# Patient Record
Sex: FEMALE | Race: WHITE
[De-identification: names, ages, dates, MRNs, and addresses within clinical notes are randomized per-mention and may not be internally consistent; named-entity substitution may affect disease eponyms.]

---

## 2019-06-21 ENCOUNTER — HOSPITAL ENCOUNTER (OUTPATIENT)
Dept: HOSPITAL 88 - LAB | Age: 84
End: 2019-06-21
Attending: PODIATRIST
Payer: MEDICARE

## 2019-06-21 DIAGNOSIS — B35.1: Primary | ICD-10-CM

## 2019-06-21 LAB
ALBUMIN SERPL-MCNC: 3.8 G/DL (ref 3.5–5)
ALP SERPL-CCNC: 80 IU/L (ref 40–150)
ALT SERPL-CCNC: 6 IU/L (ref 0–55)
BILIRUB CONJ SERPL-MCNC: 0.3 MG/DL (ref 0–0.5)

## 2019-06-21 PROCEDURE — 36415 COLL VENOUS BLD VENIPUNCTURE: CPT

## 2019-06-21 PROCEDURE — 80076 HEPATIC FUNCTION PANEL: CPT

## 2019-07-27 ENCOUNTER — HOSPITAL ENCOUNTER (INPATIENT)
Dept: HOSPITAL 88 - MED/SURG2 | Age: 84
LOS: 8 days | Discharge: HOME | DRG: 193 | End: 2019-08-04
Attending: INTERNAL MEDICINE | Admitting: INTERNAL MEDICINE
Payer: COMMERCIAL

## 2019-07-27 VITALS — SYSTOLIC BLOOD PRESSURE: 112 MMHG | DIASTOLIC BLOOD PRESSURE: 57 MMHG

## 2019-07-27 VITALS — DIASTOLIC BLOOD PRESSURE: 57 MMHG | SYSTOLIC BLOOD PRESSURE: 112 MMHG

## 2019-07-27 VITALS — DIASTOLIC BLOOD PRESSURE: 46 MMHG | SYSTOLIC BLOOD PRESSURE: 94 MMHG

## 2019-07-27 VITALS — WEIGHT: 142.19 LBS | HEIGHT: 64 IN | BODY MASS INDEX: 24.28 KG/M2

## 2019-07-27 DIAGNOSIS — J80: ICD-10-CM

## 2019-07-27 DIAGNOSIS — I10: ICD-10-CM

## 2019-07-27 DIAGNOSIS — R06.03: ICD-10-CM

## 2019-07-27 DIAGNOSIS — K66.8: ICD-10-CM

## 2019-07-27 DIAGNOSIS — Z87.891: ICD-10-CM

## 2019-07-27 DIAGNOSIS — G89.29: ICD-10-CM

## 2019-07-27 DIAGNOSIS — J18.1: Primary | ICD-10-CM

## 2019-07-27 DIAGNOSIS — R09.02: ICD-10-CM

## 2019-07-27 DIAGNOSIS — R33.9: ICD-10-CM

## 2019-07-27 DIAGNOSIS — I51.7: ICD-10-CM

## 2019-07-27 DIAGNOSIS — E78.5: ICD-10-CM

## 2019-07-27 DIAGNOSIS — I44.7: ICD-10-CM

## 2019-07-27 PROCEDURE — 93005 ELECTROCARDIOGRAM TRACING: CPT

## 2019-07-27 PROCEDURE — 71045 X-RAY EXAM CHEST 1 VIEW: CPT

## 2019-07-27 PROCEDURE — 80048 BASIC METABOLIC PNL TOTAL CA: CPT

## 2019-07-27 PROCEDURE — 87070 CULTURE OTHR SPECIMN AEROBIC: CPT

## 2019-07-27 PROCEDURE — 83735 ASSAY OF MAGNESIUM: CPT

## 2019-07-27 PROCEDURE — 93306 TTE W/DOPPLER COMPLETE: CPT

## 2019-07-27 PROCEDURE — 94664 DEMO&/EVAL PT USE INHALER: CPT

## 2019-07-27 PROCEDURE — 36415 COLL VENOUS BLD VENIPUNCTURE: CPT

## 2019-07-27 PROCEDURE — 84484 ASSAY OF TROPONIN QUANT: CPT

## 2019-07-27 PROCEDURE — 80202 ASSAY OF VANCOMYCIN: CPT

## 2019-07-27 PROCEDURE — 87205 SMEAR GRAM STAIN: CPT

## 2019-07-27 PROCEDURE — 85025 COMPLETE CBC W/AUTO DIFF WBC: CPT

## 2019-07-27 PROCEDURE — 87040 BLOOD CULTURE FOR BACTERIA: CPT

## 2019-07-27 PROCEDURE — 71260 CT THORAX DX C+: CPT

## 2019-07-27 NOTE — NUR
Received patient from ems via stretcher. AAOX3 and amb. No resp distress at this time. 
Patient stated she take Ventolin inhaler prn. Denies hx of copd or other respiratory issues. 
Applied o2 2L n/c for saturation of 88% per protocol. Denies pain. No n/v/d. Call light 
within reach and instructed to call for assistance.  at bedside. Refuses bed alarm.

## 2019-07-28 VITALS — DIASTOLIC BLOOD PRESSURE: 60 MMHG | SYSTOLIC BLOOD PRESSURE: 135 MMHG

## 2019-07-28 VITALS — DIASTOLIC BLOOD PRESSURE: 56 MMHG | SYSTOLIC BLOOD PRESSURE: 115 MMHG

## 2019-07-28 VITALS — SYSTOLIC BLOOD PRESSURE: 117 MMHG | DIASTOLIC BLOOD PRESSURE: 59 MMHG

## 2019-07-28 VITALS — DIASTOLIC BLOOD PRESSURE: 56 MMHG | SYSTOLIC BLOOD PRESSURE: 111 MMHG

## 2019-07-28 VITALS — SYSTOLIC BLOOD PRESSURE: 121 MMHG | DIASTOLIC BLOOD PRESSURE: 59 MMHG

## 2019-07-28 LAB
ANION GAP SERPL CALC-SCNC: 11.5 MMOL/L (ref 8–16)
BASOPHILS # BLD AUTO: 0 10*3/UL (ref 0–0.1)
BASOPHILS NFR BLD AUTO: 0.3 % (ref 0–1)
BUN SERPL-MCNC: 13 MG/DL (ref 7–26)
BUN/CREAT SERPL: 18 (ref 6–25)
CALCIUM SERPL-MCNC: 8.9 MG/DL (ref 8.4–10.2)
CHLORIDE SERPL-SCNC: 106 MMOL/L (ref 98–107)
CO2 SERPL-SCNC: 26 MMOL/L (ref 22–29)
DEPRECATED NEUTROPHILS # BLD AUTO: 5.9 10*3/UL (ref 2.1–6.9)
EGFRCR SERPLBLD CKD-EPI 2021: > 60 ML/MIN (ref 60–?)
EOSINOPHIL # BLD AUTO: 0 10*3/UL (ref 0–0.4)
EOSINOPHIL NFR BLD AUTO: 0.4 % (ref 0–6)
ERYTHROCYTE [DISTWIDTH] IN CORD BLOOD: 13.3 % (ref 11.7–14.4)
GLUCOSE SERPLBLD-MCNC: 123 MG/DL (ref 74–118)
HCT VFR BLD AUTO: 34.7 % (ref 34.2–44.1)
HGB BLD-MCNC: 10.8 G/DL (ref 12–16)
LYMPHOCYTES # BLD: 1 10*3/UL (ref 1–3.2)
LYMPHOCYTES NFR BLD AUTO: 13 % (ref 18–39.1)
MCH RBC QN AUTO: 27.3 PG (ref 28–32)
MCHC RBC AUTO-ENTMCNC: 31.1 G/DL (ref 31–35)
MCV RBC AUTO: 87.8 FL (ref 81–99)
MONOCYTES # BLD AUTO: 0.7 10*3/UL (ref 0.2–0.8)
MONOCYTES NFR BLD AUTO: 9.3 % (ref 4.4–11.3)
NEUTS SEG NFR BLD AUTO: 76.6 % (ref 38.7–80)
PLATELET # BLD AUTO: 204 X10E3/UL (ref 140–360)
POTASSIUM SERPL-SCNC: 3.5 MMOL/L (ref 3.5–5.1)
RBC # BLD AUTO: 3.95 X10E6/UL (ref 3.6–5.1)
SODIUM SERPL-SCNC: 140 MMOL/L (ref 136–145)

## 2019-07-28 RX ADMIN — CEFTRIAXONE SCH MLS/HR: 100 INJECTION, POWDER, FOR SOLUTION INTRAVENOUS at 15:16

## 2019-07-28 RX ADMIN — SODIUM CHLORIDE SCH MLS/HR: 900 INJECTION, SOLUTION INTRAVENOUS at 16:15

## 2019-07-28 RX ADMIN — METOPROLOL TARTRATE SCH MG: 25 TABLET, FILM COATED ORAL at 17:00

## 2019-07-28 RX ADMIN — ATORVASTATIN CALCIUM SCH MG: 20 TABLET, FILM COATED ORAL at 21:18

## 2019-07-28 RX ADMIN — AMLODIPINE BESYLATE SCH MG: 5 TABLET ORAL at 08:36

## 2019-07-28 RX ADMIN — METOPROLOL TARTRATE SCH MG: 25 TABLET, FILM COATED ORAL at 08:36

## 2019-07-28 NOTE — NUR
LFA 20 G IV REMOVED DUE TO LEAKING. TIP INTACT. NO BLEEDING NOTED. DRESSING APPLIED. PT 
DENIED FURTHER NEEDS.

## 2019-07-28 NOTE — DIAGNOSTIC IMAGING REPORT
EXAMINATION: CT of the chest with contrast, PE protocol.



TECHNIQUE:  Spiral CT images of the chest were performed from the lung apices

through the level of the adrenal glands after the IV administration of 100 cc

of Isovue 370.  Thin section reconstructions were obtained with special

concentration on the pulmonary arteries. Coronal and sagittal reformatted

images were also performed.



COMPARISON: <none>



CLINICAL HISTORY:Hypoxia, rule out PE

     

DISCUSSION:



Lungs:  No filling defects are identified in the main, right or left pulmonary

arteries to their segmental levels, to suggest pulmonary embolism.

Airspace consolidation with air bronchograms involving the right middle lobe

(series 3, image 77 and sagittal image 40).

1.6 x 1.0 cm nodular density with linear extensions to the adjacent subpleural

space in the right upper lobe and with associated focal bronchiectasis (series

3, image 40).

Mild bilateral upper lobe emphysematous changes.

Atelectatic changes in the posterior right lower lobe and inferior left lower

lobe (series 3, images 90 and 96).

Moderate central bronchial wall thickening in the upper and lower lobes.

Airways are clear, without endobronchial lesion.



Pleura:  Small right-sided pleural effusion. No pneumothorax.



Heart and mediastinum:  Thyroid is unremarkable. Heart size is normal. No

pericardial effusion. Atherosclerotic calcification of the coronary arteries

and thoracic aorta. Main pulmonary artery measures approximately 3.0 cm.



Lymph nodes: Calcified right lower paratracheal and right hilar lymph nodes. No

adenopathy.



Abdomen:  Multiple splenic and hepatic calcified granulomas. Subcentimeter

hypodense lesions in the right and left hepatic lobe (series 2, images 103 and

107, respectively), which are too small to characterize.

2 mm and punctate nonobstructing calculi in the right renal superior pole

(series 2, images 110, 113 and 119).

1.3 x 1.3 cm hypodense lesion in the left adrenal gland (series 2, image 111),

which measures less than 10 Hounsfield units on this contrast-enhanced exam.

Additional 1.0 x 1.0 cm hypodense lesion in the left adrenal gland, which

measures less than 10 Hounsfield units (series 2, image 117).

Atherosclerotic calcification of the abdominal aorta and aortic branches 



Bones and soft tissues:  No aggressive lytic lesions. Generalized osteopenia.

Soft tissues are grossly unremarkable.



IMPRESSION: 



1. No CT evidence of pulmonary embolism.



2. Findings consistent with right middle lobe pneumonia.

3. 1.6 cm nodular density with associated linear opacities to the adjacent

substernal space and focal bronchiectasis likely represents chronic

postinfectious scarring. An additional focus of pneumonia is a less likely

consideration.

4. Small right-sided pleural effusion and bibasilar atelectasis.

5. Moderate central bronchial wall thickening in the upper and lower lobes,

likely representing sequela of chronic bronchitis.

6. 1.3 and 1.0 cm well circumscribed hypodense lesions in the left adrenal

gland are consistent with benign, lipid rich adenomas.

7. Nonobstructing calculi in the right superior pole, with the largest

measuring 2 mm.

8. Calcified mediastinal and right hilar nodes and splenic and hepatic

granulomas, consistent with prior granulomatous disease.









Signed by: Dr. Deven Rocha M.D. on 7/28/2019 5:33 PM

## 2019-07-28 NOTE — HISTORY AND PHYSICAL
CHIEF COMPLAINT:  Cough and congestion.

 

HISTORY OF PRESENT ILLNESS:  This is an 84-year-old  female with past medical

history of hypertension, hyperlipidemia, urinary retention, who presents from an outside

ER with complaints of cough, congestion, and subjective fevers ongoing for the last

several days.  The patient reports she had similar symptoms about a week ago, then

resolved suddenly over the last two or three days.  She has noticed increased cough,

congestion, subjective fever, went to an outside ER for further evaluation and

management.  She denies any chest pain, [id number], shortness of breath, or any other

complaints.  Endorses to have a nonproductive cough with congestion and occasional

fever.  Denies any diarrhea.  __________ she was found to be hypoxic and O2 sats in the

80s on arrival.  She denies any history of smoking or any home oxygen as well.  The

patient was seen and evaluated at bedside on the medical floor.  She was put on nasal

oxygen at 5 L.  She states she is feeling much better on examination.  Vital signs

stable when I evaluated her. 

 

REVIEW OF SYSTEMS:

Pertinent positive:  Cough and congestion.  Subjective fevers. 

 

Pertinent negative:  Denies any chest pain, palpitations, nausea, vomiting, diarrhea,

dysuria, hematuria, frequency, urgency, lightheadedness, dizziness, abdominal pain,

headaches, or any other complaints. 

 

The rest of the 14-point review of systems have been reviewed with the patient and are

negative. 

 

ALLERGIES:  SULFA.

 

HOME MEDICATIONS:  

1. Albuterol inhaler.

2. Norvasc 5 mg daily.

3. __________. 

4. Metoprolol tartrate 25 mg p.o. b.i.d.

5. Oxybutynin 5 mg __________ as needed.

6. Sertraline 25 mg daily.

 

PAST MEDICAL HISTORY:  Hypertension, urinary retention, and hyperlipidemia.

 

PAST SURGICAL HISTORY:  None.

 

FAMILY HISTORY:  Hypertension and diabetes.

 

SOCIAL HISTORY:  No drugs.  No alcohol.  Does not smoke.  Good social support.  She is

. 

 

PHYSICAL EXAMINATION:

VITAL SIGNS:  Temperature is 97.5, pulse 71, respiratory rate is 16, blood pressure is

115/56, and pulse ox 94%, now on 2 L nasal cannula. 

GENERAL:  Not in acute distress.  Alert and oriented x3.  Cooperative on examination. 

HEENT:  Head is normocephalic and atraumatic.  Eyes; pupils are equal, round, and

reactive to light bilaterally.  Extraocular muscles are intact bilaterally.  Throat, no

evidence of erythema or exudates in the posterior pharynx.  Has poor dentition. 

NECK:  Supple.  Good range of motion. 

PULMONARY:  Clear to auscultation bilaterally.  No wheezing, no rales, no rhonchi, no

crackles appreciated. 

CARDIOVASCULAR:  Positive S1, S2.  No murmurs, rubs, or gallops appreciated. 

ABDOMEN:  Soft, nondistended, and nontender to palpation.  Bowel sounds present. 

MUSCULOSKELETAL:  Strength is 5/5 throughout.  No evidence of any muscle deficits on

examination.  No weakness appreciated. 

NEUROLOGICAL:  Cranial nerves 2 through 12 grossly intact.  No evidence of any

neurological deficits on exam. 

SKIN:  Intact.  Warm to touch.  Good cap refill. 

PSYCHIATRIC:  Normal affect and mood. 

EXTREMITIES:  No edema.  Good range of motion throughout.

LAB FINDINGS:  Show white count 7.7, hemoglobin 10.8, hematocrit 34.7, platelets of 204.

 Chemistry, sodium 140, potassium 3.5, chloride 106, bicarb 26, anion gap of 11, BUN 13,

creatinine 0.72, glucose 123, calcium is 8.9.  Urinalysis was negative. 

 

MICROBIOLOGY:  None.

 

IMAGING STUDIES:  At outside freestanding ER, chest x-ray shows right middle lobe

opacity, consistent with pneumonia, edema, or under volume loss.  There are some ground

gross opacities seen. 

 

IMPRESSION:  

1. Acute hypoxemic respiratory distress, likely due to multifocal pneumonia.

2. Community-acquired pneumonia.

3. Hypertension.

4. Hyperlipidemia.

5. Left bundle branch block.

 

PLAN:  At this time, we are going to follow up on blood cultures at the outside

facility, which we are going to confirm and see on that.  She is currently afebrile,

normotensive, respiratory rate is good, and her white count is normal.  Continue with IV

azithromycin and Rocephin, I have no other EKG to compare.  At this time, I may get

Cardiology to come see her because there is left bundle-branch block which appeared to

be old, but I do not have records to prove that.  I am going to resume same home

medications.  Trend troponins.  Continue with cough syrup and Tessalon Perles for cough.

 Ambulate as tolerated as well as PT and OT evaluation.  Lovenox for DVT prophylaxis.

Our goal is to wean off oxygen in the event we do not.  I want to go ahead and get a CT

chest with PE protocol to see if she improves otherwise. 

 

 

 

 

______________________________

MD NEGRA Saldaña/MODL

D:  07/28/2019 15:01:03

T:  07/28/2019 17:41:22

Job #:  [id number]

## 2019-07-29 VITALS — SYSTOLIC BLOOD PRESSURE: 115 MMHG | DIASTOLIC BLOOD PRESSURE: 59 MMHG

## 2019-07-29 VITALS — SYSTOLIC BLOOD PRESSURE: 135 MMHG | DIASTOLIC BLOOD PRESSURE: 74 MMHG

## 2019-07-29 VITALS — DIASTOLIC BLOOD PRESSURE: 64 MMHG | SYSTOLIC BLOOD PRESSURE: 126 MMHG

## 2019-07-29 VITALS — SYSTOLIC BLOOD PRESSURE: 148 MMHG | DIASTOLIC BLOOD PRESSURE: 74 MMHG

## 2019-07-29 VITALS — SYSTOLIC BLOOD PRESSURE: 137 MMHG | DIASTOLIC BLOOD PRESSURE: 65 MMHG

## 2019-07-29 VITALS — SYSTOLIC BLOOD PRESSURE: 121 MMHG | DIASTOLIC BLOOD PRESSURE: 58 MMHG

## 2019-07-29 VITALS — DIASTOLIC BLOOD PRESSURE: 54 MMHG | SYSTOLIC BLOOD PRESSURE: 109 MMHG

## 2019-07-29 VITALS — DIASTOLIC BLOOD PRESSURE: 59 MMHG | SYSTOLIC BLOOD PRESSURE: 128 MMHG

## 2019-07-29 LAB
ANION GAP SERPL CALC-SCNC: 14.9 MMOL/L (ref 8–16)
BASOPHILS # BLD AUTO: 0 10*3/UL (ref 0–0.1)
BASOPHILS NFR BLD AUTO: 0.6 % (ref 0–1)
BUN SERPL-MCNC: 7 MG/DL (ref 7–26)
BUN/CREAT SERPL: 11 (ref 6–25)
CALCIUM SERPL-MCNC: 9.2 MG/DL (ref 8.4–10.2)
CHLORIDE SERPL-SCNC: 105 MMOL/L (ref 98–107)
CO2 SERPL-SCNC: 25 MMOL/L (ref 22–29)
DEPRECATED NEUTROPHILS # BLD AUTO: 3.3 10*3/UL (ref 2.1–6.9)
EGFRCR SERPLBLD CKD-EPI 2021: > 60 ML/MIN (ref 60–?)
EOSINOPHIL # BLD AUTO: 0.2 10*3/UL (ref 0–0.4)
EOSINOPHIL NFR BLD AUTO: 3 % (ref 0–6)
ERYTHROCYTE [DISTWIDTH] IN CORD BLOOD: 13.5 % (ref 11.7–14.4)
GLUCOSE SERPLBLD-MCNC: 82 MG/DL (ref 74–118)
HCT VFR BLD AUTO: 36.8 % (ref 34.2–44.1)
HGB BLD-MCNC: 11.6 G/DL (ref 12–16)
LYMPHOCYTES # BLD: 1.3 10*3/UL (ref 1–3.2)
LYMPHOCYTES NFR BLD AUTO: 24.7 % (ref 18–39.1)
MCH RBC QN AUTO: 27.6 PG (ref 28–32)
MCHC RBC AUTO-ENTMCNC: 31.5 G/DL (ref 31–35)
MCV RBC AUTO: 87.6 FL (ref 81–99)
MONOCYTES # BLD AUTO: 0.4 10*3/UL (ref 0.2–0.8)
MONOCYTES NFR BLD AUTO: 8.4 % (ref 4.4–11.3)
NEUTS SEG NFR BLD AUTO: 63.1 % (ref 38.7–80)
PLATELET # BLD AUTO: 220 X10E3/UL (ref 140–360)
POTASSIUM SERPL-SCNC: 3.9 MMOL/L (ref 3.5–5.1)
RBC # BLD AUTO: 4.2 X10E6/UL (ref 3.6–5.1)
SODIUM SERPL-SCNC: 141 MMOL/L (ref 136–145)

## 2019-07-29 RX ADMIN — SODIUM CHLORIDE SCH MLS/HR: 900 INJECTION, SOLUTION INTRAVENOUS at 16:30

## 2019-07-29 RX ADMIN — AMLODIPINE BESYLATE SCH MG: 5 TABLET ORAL at 08:06

## 2019-07-29 RX ADMIN — METOPROLOL TARTRATE SCH MG: 25 TABLET, FILM COATED ORAL at 16:37

## 2019-07-29 RX ADMIN — METOPROLOL TARTRATE SCH MG: 25 TABLET, FILM COATED ORAL at 08:06

## 2019-07-29 RX ADMIN — ATORVASTATIN CALCIUM SCH MG: 20 TABLET, FILM COATED ORAL at 20:42

## 2019-07-29 RX ADMIN — CEFTRIAXONE SCH MLS/HR: 100 INJECTION, POWDER, FOR SOLUTION INTRAVENOUS at 15:35

## 2019-07-29 NOTE — NUR
Nutrition Intervention Note



RD Recommendation(s) for Physician: 

-Continue Regular diet

-Recommend Ensure Compact TID



Plan of Care: RD following, monitoring for tolerance and adequacy 



Nutrition reason for involvement:

Nutrition Risk Trigger



RD Assessment

7/29: 84 YOF admitted for PNA, seen today per MST screen. Pt reports decreased appetite x 1 
month and that she was forcing herself to eat, but could not quantify if she was eating less 
than her typical intake. Per chart pt eating 50% of meals. Pt can not recall UBW, appears 
well nourished. Pt reports tolerating Regular diet, receptive to Ensure Compact. Pt denies 
any GI distress or difficulties chewing or swallowing. Pt discussed during am rounds. Will 
monitor and continue to follow. 



Principal Problems/Diagnoses: Pneumonia 



PMH: HTN, HLD



GI: LBM 7/29- diarrhea, on abx 



Skin: intact 



Labs: reviewed; BMP WNL 



Meds: lipitor, abx, zofran 



Ht: 64 in

Wt: 150 lb

BMI: 25.7

IBW: 120 lb 



Malnutrition Evaluation (7/29l/19)

The patient does not meet criteria for a specified degree of malnutrition at this time. Will 
re-evaluate at follow-up as appropriate. 





Nutrition Prescription (Diet Order): Regular 



Estimated Nutritional Needs:

2145-6072 calories/day (22-30 kcal/kg CBW)

 g protein/day (1-1.5 g pro/kg CBW)





Diet Adequacy:

Not meeting calorie needs, Not meeting protein needs



Diet Education Needs Assessment:

Diet education not indicated; patient on regular diet. 



Nutrition Care Level: Mod 



Nutrition Diagnosis: Inadequate energy and protein intake related to PNA as evidenced by 
decreased appetite, decreased po intake, and not meeting needs x 1 mo PTA. 





Goal: Patient will meet % of estimated needs by follow up 

Progress: N/A



Interventions:

General healthful diet, Schedule of food, Commercial beverage, Recommended Modifications, 
Collaboration with other providers



Monitoring/Evaluation:

Total energy intake, Total protein intake, Liquid supplement





Signed: Hamida Portillo RD, LD, [facility]

## 2019-07-29 NOTE — CONSULTATION
DATE OF CONSULTATION:  

 

Cardiology Consultation 

 

CHIEF COMPLAINT:  The patient is an 84-year-old with fever and chills.

 

HISTORY OF PRESENT ILLNESS:  The patient is an 84-year-old, who developed fever and

chills at home.  She reported dyspnea and cough with some yellow sputum production.  The

patient was noted to have an infiltrate on the x-ray and was subsequently admitted.  The

patient was noted to have a left bundle branch block on EKG and a Cardiology

consultation was called. 

 

PAST MEDICAL AND SURGICAL HISTORY:  Significant for:

1. Hypercholesterolemia.

2. Hypertension.

3. Previous hip surgery.

4. Previous kidney surgery.

 

MEDICATIONS:  At home, include:

1. Simvastatin.

2. Metoprolol.

3. Amlodipine.

 

SOCIAL HISTORY:  The patient does not drink and does not smoke.

 

FAMILY HISTORY:  There is no known family history of coronary artery disease.

 

PHYSICAL EXAMINATION:

GENERAL:  The patient is a well-developed, well-nourished female, in no obvious

distress. 

VITAL SIGNS:  Include a temperature of 98.6, blood pressure 130/70, and pulse 84. 

HEAD, EARS, EYES, NOSE, AND THROAT:  The patient's cranium was normocephalic and

atraumatic.  Extraocular muscles were intact.  Sclerae were anicteric.  Pupils were

equal, round, and reactive to light.  There was no pallor or cyanosis of the oral

mucosa.  There was no erythema or edema of the throat. 

NECK:  Supple.  No jugular venous distention.  No carotid bruits. 

CHEST:  Demonstrated rales and rhonchi. 

CARDIAC:  Demonstrated normal S1 and S2 with a short 2/6 systolic murmur. 

ABDOMEN:  Demonstrated good bowel sounds.  No tenderness and no masses. 

EXTREMITIES:  There was no clubbing, no cyanosis, and no edema. 

NEUROLOGIC:  The patient was alert and oriented x3.  Cranial nerves II through XII were

intact.  Motor strength was +5/+5 in all limbs. 

RADIOGRAPHIC DATA:  The patient's EKG demonstrated normal sinus rhythm with a left

bundle branch block. 

 

IMPRESSION AND PLAN:  The patient is an 84-year-old admitted with pneumonia.  The

patient has a left bundle branch block on her EKG, which seems to be chronic.  The

patient has had no chest pain and no symptoms suggestive of cardiac ischemia.  I do feel

that the patient 

should have an echocardiogram to assess the left ventricular size and function given the

left bundle branch block. 

 

 

 

 

______________________________

MD VLADIMIR Cuevas/SOCAR

D:  07/29/2019 15:30:55

T:  07/29/2019 22:44:44

Job #:  [id number]

 

cc:            Isaac Cueto MD

## 2019-07-29 NOTE — DIAGNOSTIC IMAGING REPORT
KNEE LEFT 2 VIEWS, KNEE RIGHT 2 VIEWS 



HISTORY:  Pain.    

COMPARISON: None available.

     

FINDINGS:

Bones:

No acute displaced fracture.  

Osseous alignment is within normal limits.



Joints:

The joint spaces are well-maintained.

Mild sclerotic changes in the knee joints.

No effusions.



Soft tissues:

Vascular calcifications.



IMPRESSION: 



No acute radiographic abnormality.

Degenerative changes in the knees.



Signed by: Drew Obregon DO on 7/29/2019 10:23 PM

## 2019-07-29 NOTE — NUR
NURSE WAS WALKING WITH PATIENT AT THE [address]. PATIENT WAS WALKING A LITTLE FAST. NURSE 
REMINDED PATIENT TO WALK SLOWLY. PATIENT KEPT WALKING FAST. PATIENT TRIP AND FELL ON HER 
KNEE. PATIENT SAID SHE WASN'T COMFORTABLE IN HER NEW SHOE. RIGHT KNEE REDNESS NOTED. PATIENT 
DENIED OF ANY PAIN OR DISCOMFORT. NOTIFIED CHARGE NURSE. CALLED DR HASTINGS. NEW ORDER RECEIVED 
FOR GALE KNEE XRAY

## 2019-07-29 NOTE — PROGRESS NOTE
DATE:  07/29/2019

 

Medicine Progress Note 

 

SUBJECTIVE:  The patient is doing much better today.  Her oxygen was removed yesterday

last night.  She is breathing well.  She did not ambulate across the [place]int[place], which I will

like to see how she does with ambulation.  Of note, she was on 2 L nasal cannula

according to chart early this morning.  She is not coughing.  She is doing better.  No

other issues.  Blood cultures at the outside ER, which are still pending. 

 

PHYSICAL EXAMINATION:

VITAL SIGNS:  Temperature 96.8, pulse 80, respiratory rate 17, blood pressure 126/64,

pulse ox 91% on room air. 

GENERAL:  Not in acute distress.  Alert and oriented x3.  Cooperative on examination. 

HEENT:  Head is normocephalic and atraumatic.  Eyes; pupils are equal, round, and

reactive to light bilaterally.  Extraocular movements are intact bilaterally.  Throat,

no evidence of erythema or exudates in the posterior pharynx.  Has poor dentition. 

NECK:  Supple.  Good range of motion. 

PULMONARY:  Clear to auscultation bilaterally.  No wheezing, no rales, no rhonchi, no

crackles appreciated. 

CARDIOVASCULAR:  Positive S1, S2.  No murmurs, rubs, or gallops appreciated. 

ABDOMEN:  Soft, nondistended, and nontender to palpation.  Bowel sounds present. 

MUSCULOSKELETAL:  Strength is 5/5 throughout.  No evidence of any muscle deficits on

examination.  No weakness appreciated. 

NEUROLOGICAL:  Cranial nerves II through XII grossly intact.  No evidence of any

neurological deficits on exam. 

SKIN:  Intact.  Warm to touch.  Good cap refill. 

PSYCHIATRIC:  Normal affect and mood. 

EXTREMITIES:  No edema.  Good range of motion throughout.

LABORATORY FINDINGS:  Show white count 5.2, hemoglobin 11.6, hematocrit is 36.8,

platelets of 220.  Chemistries reviewed and stable.  Blood cultures at the outside

hospital are pending and verified as of today.  Microbiology none, except for the blood

cultures at the outside ER. 

 

IMAGING STUDIES:  CT chest with IV contrast showed no evidence of pulmonary embolism.

Findings suggestive of right middle lobe pneumonia.  There is a 1.6 cm nodule

__________.  There is some bronchial wall thickening in the upper and lower lobes,

likely representing chronic bronchitis.  There is a 1.31 cm well-circumscribed hypodense

lesions in the left adrenal gland consistent with benign lipid rich adenomas. 

 

IMPRESSION:  

1. Acute hypoxemic respiratory distress secondary to multifocal pneumonia, now off

oxygen. 

2. Community-acquired pneumonia.

3. Hypertension.

4. Hyperlipidemia.

5. Left bundle-branch block. 

At this time, we are going to go ahead and follow with blood cultures at outside ER.  We

have confirmed that there were 2 blood cultures, but there is pending final growth.  So

far, there is no growth to date.  Continue with IV antibiotics azithromycin and

Rocephin.  In relation to her left bundle-branch block, I did consult with Cardiology

and I am not sure if that left bundle-branch block is old or new.  They will hopefully

help us out with this and differentiate that.  May need further workup as an outpatient.

 She is on cough syrup and Tessalon Perles.  Continue with PT and OT.  I did discuss

with the nursing staff to go ahead and ambulate the patient and see if she desaturates.

I will go ahead and consult pulmonologist as well to look at the CT scan and to see if

there is any further workup needed because it shows evidence of chronic bronchitis.

Otherwise, consultants, Pulmonary and Cardiology will continue same plan of care and

monitor very closely. 

 

 

 

 

______________________________

MD NEGRA Saldaña/OSCAR

D:  07/29/2019 13:45:51

T:  07/29/2019 15:30:20

Job #:  [id number]

## 2019-07-29 NOTE — NUR
PATIENT RESTING QUIETLY IN BED. RESP EVEN AND UNLABORED. NO ACUTE DISTRESS NOTED. BED ALARM 
ON. CONTINUE TO MONITOR CLOSELY

## 2019-07-29 NOTE — NUR
IMM letter delivered and explained to pt and family at bedside. They verbalized 
understanding. Signed copy placed in chart. Copy to pt.

## 2019-07-30 VITALS — DIASTOLIC BLOOD PRESSURE: 76 MMHG | SYSTOLIC BLOOD PRESSURE: 141 MMHG

## 2019-07-30 VITALS — SYSTOLIC BLOOD PRESSURE: 142 MMHG | DIASTOLIC BLOOD PRESSURE: 59 MMHG

## 2019-07-30 VITALS — DIASTOLIC BLOOD PRESSURE: 64 MMHG | SYSTOLIC BLOOD PRESSURE: 129 MMHG

## 2019-07-30 VITALS — SYSTOLIC BLOOD PRESSURE: 129 MMHG | DIASTOLIC BLOOD PRESSURE: 64 MMHG

## 2019-07-30 VITALS — SYSTOLIC BLOOD PRESSURE: 134 MMHG | DIASTOLIC BLOOD PRESSURE: 61 MMHG

## 2019-07-30 VITALS — DIASTOLIC BLOOD PRESSURE: 72 MMHG | SYSTOLIC BLOOD PRESSURE: 127 MMHG

## 2019-07-30 VITALS — SYSTOLIC BLOOD PRESSURE: 127 MMHG | DIASTOLIC BLOOD PRESSURE: 59 MMHG

## 2019-07-30 LAB
ANION GAP SERPL CALC-SCNC: 16.4 MMOL/L (ref 8–16)
ANION GAP SERPL CALC-SCNC: 19.1 MMOL/L (ref 8–16)
BASOPHILS # BLD AUTO: 0 10*3/UL (ref 0–0.1)
BASOPHILS # BLD AUTO: 0.1 10*3/UL (ref 0–0.1)
BASOPHILS NFR BLD AUTO: 0.7 % (ref 0–1)
BASOPHILS NFR BLD AUTO: 0.7 % (ref 0–1)
BUN SERPL-MCNC: 10 MG/DL (ref 7–26)
BUN SERPL-MCNC: 7 MG/DL (ref 7–26)
BUN/CREAT SERPL: 12 (ref 6–25)
BUN/CREAT SERPL: 15 (ref 6–25)
CALCIUM SERPL-MCNC: 9.1 MG/DL (ref 8.4–10.2)
CALCIUM SERPL-MCNC: 9.8 MG/DL (ref 8.4–10.2)
CHLORIDE SERPL-SCNC: 102 MMOL/L (ref 98–107)
CHLORIDE SERPL-SCNC: 103 MMOL/L (ref 98–107)
CO2 SERPL-SCNC: 23 MMOL/L (ref 22–29)
CO2 SERPL-SCNC: 25 MMOL/L (ref 22–29)
DEPRECATED NEUTROPHILS # BLD AUTO: 4 10*3/UL (ref 2.1–6.9)
DEPRECATED NEUTROPHILS # BLD AUTO: 4.9 10*3/UL (ref 2.1–6.9)
EGFRCR SERPLBLD CKD-EPI 2021: > 60 ML/MIN (ref 60–?)
EGFRCR SERPLBLD CKD-EPI 2021: > 60 ML/MIN (ref 60–?)
EOSINOPHIL # BLD AUTO: 0.1 10*3/UL (ref 0–0.4)
EOSINOPHIL # BLD AUTO: 0.1 10*3/UL (ref 0–0.4)
EOSINOPHIL NFR BLD AUTO: 1.6 % (ref 0–6)
EOSINOPHIL NFR BLD AUTO: 2.3 % (ref 0–6)
ERYTHROCYTE [DISTWIDTH] IN CORD BLOOD: 13.2 % (ref 11.7–14.4)
ERYTHROCYTE [DISTWIDTH] IN CORD BLOOD: 13.2 % (ref 11.7–14.4)
GLUCOSE SERPLBLD-MCNC: 71 MG/DL (ref 74–118)
GLUCOSE SERPLBLD-MCNC: 93 MG/DL (ref 74–118)
HCT VFR BLD AUTO: 38.4 % (ref 34.2–44.1)
HCT VFR BLD AUTO: 40.7 % (ref 34.2–44.1)
HGB BLD-MCNC: 12.2 G/DL (ref 12–16)
HGB BLD-MCNC: 13.1 G/DL (ref 12–16)
LYMPHOCYTES # BLD: 1.3 10*3/UL (ref 1–3.2)
LYMPHOCYTES # BLD: 1.4 10*3/UL (ref 1–3.2)
LYMPHOCYTES NFR BLD AUTO: 18.9 % (ref 18–39.1)
LYMPHOCYTES NFR BLD AUTO: 22.2 % (ref 18–39.1)
MCH RBC QN AUTO: 27.8 PG (ref 28–32)
MCH RBC QN AUTO: 27.9 PG (ref 28–32)
MCHC RBC AUTO-ENTMCNC: 31.8 G/DL (ref 31–35)
MCHC RBC AUTO-ENTMCNC: 32.2 G/DL (ref 31–35)
MCV RBC AUTO: 86.6 FL (ref 81–99)
MCV RBC AUTO: 87.5 FL (ref 81–99)
MONOCYTES # BLD AUTO: 0.5 10*3/UL (ref 0.2–0.8)
MONOCYTES # BLD AUTO: 0.6 10*3/UL (ref 0.2–0.8)
MONOCYTES NFR BLD AUTO: 8.4 % (ref 4.4–11.3)
MONOCYTES NFR BLD AUTO: 9.1 % (ref 4.4–11.3)
NEUTS SEG NFR BLD AUTO: 65.9 % (ref 38.7–80)
NEUTS SEG NFR BLD AUTO: 69 % (ref 38.7–80)
PLATELET # BLD AUTO: 248 X10E3/UL (ref 140–360)
PLATELET # BLD AUTO: 278 X10E3/UL (ref 140–360)
POTASSIUM SERPL-SCNC: 3.4 MMOL/L (ref 3.5–5.1)
POTASSIUM SERPL-SCNC: 4.1 MMOL/L (ref 3.5–5.1)
RBC # BLD AUTO: 4.39 X10E6/UL (ref 3.6–5.1)
RBC # BLD AUTO: 4.7 X10E6/UL (ref 3.6–5.1)
SODIUM SERPL-SCNC: 140 MMOL/L (ref 136–145)
SODIUM SERPL-SCNC: 141 MMOL/L (ref 136–145)

## 2019-07-30 RX ADMIN — CEFEPIME HYDROCHLORIDE SCH MLS/HR: 1 INJECTION, POWDER, FOR SOLUTION INTRAMUSCULAR; INTRAVENOUS at 20:30

## 2019-07-30 RX ADMIN — ENOXAPARIN SODIUM SCH MG: 40 INJECTION SUBCUTANEOUS at 17:00

## 2019-07-30 RX ADMIN — ATORVASTATIN CALCIUM SCH MG: 20 TABLET, FILM COATED ORAL at 20:31

## 2019-07-30 RX ADMIN — METOPROLOL TARTRATE SCH MG: 25 TABLET, FILM COATED ORAL at 08:35

## 2019-07-30 RX ADMIN — METOPROLOL TARTRATE SCH MG: 25 TABLET, FILM COATED ORAL at 17:00

## 2019-07-30 RX ADMIN — VANCOMYCIN HYDROCHLORIDE SCH MLS/HR: 1 INJECTION, SOLUTION INTRAVENOUS at 16:00

## 2019-07-30 RX ADMIN — AMLODIPINE BESYLATE SCH MG: 5 TABLET ORAL at 08:35

## 2019-07-30 NOTE — CONSULTATION
DATE OF CONSULTATION:  07/29/2019

 

Pulmonary Medicine Consult 

 

PRIMARY CARE DOCTOR:  Eric Daniels MD

 

REASON FOR REFERRAL:  Abnormal chest radiography.

 

HISTORY OF PRESENT ILLNESS:  Mrs. Blevins is a pleasant 84-year-old female with 
abnormal

chest radiography.  The patient presented with 1-1/2 weeks of symptoms.  She had

malaise.  She mostly did not have fevers, but she did have fever that was 
reportedly

high around 102 degrees the day she went to outside hospital emergency room 
facility for

evaluation, although I did not see this citation of high fever.  She was on 3 
L/minute

by nasal cannula to have 89% oxygen saturation initially.  Some airways 
congestion, but

not a lot of phlegm output.  Chest x-ray demonstrated a right middle lobe 
consolidation.

 The patient proceeded to have computed tomography of thorax, which showed a 
right upper

lobe spiculated semi-solid nodule with a penumbra of ground-glass with some 
small

central cavitation.  There was a dense right middle lobe consolidation.  There 
is 31 mm

pulmonary artery. 

 

The patient was admitted and treated with antibiotics and she initially already 
has

improvement of her malaise.  There was not much other findings to track 
progress, but I

note on the computer there are no fevers since hospitalization.  White count is 
normal. 

 

PAST MEDICAL HISTORY:  Hypertension, urinary retention, hyperlipidemia.

 

MEDICATIONS:  Medication list reviewed per the chart record.

 

ALLERGIES:  SULFA.

 

FAMILY HISTORY:  Noncontributory to this condition.

 

SOCIAL HISTORY:  The patient denies drinking and denies drugs.  The patient 
smoked from

age 26 to age 59, 1/2 pack per day.  She is a homemaker.  She was born in 
[place], [place].  She left that area at age 20 and came mainly to [place], where she has
lived

ever since.  However, her  was a .  The patient had transient 
stays in

[place], [place], and other states along the pipe routes.  The patient 
was not an

outdoors person and she did not have pets and never was around a lot of birds or
bats.

The patient would go [place] fishing every 2 weeks when they were younger, 
but quit

when the patient's children grew up.  She would sometime camp for a few days, 
but no

longer camp since her children grew up. 

 

REVIEW OF SYSTEMS:

GENERAL:  5-pound weight loss in the last 1-1/2 week.  Otherwise, no changes. 

ENDOCRINE:  No anorexia. 

ENT:  No mouth blisters. 

OPHTHALMOLOGIC:  No change in vision. 

PULMONARY:  No asthma. 

CARDIAC:  No heart attacks known, although left bundle-branch block is being

investigated. 

GI:  No known abdominal infection serious in the past. 

:  No recent kidney stones. 

PSYCHIATRIC:  No depression. 

NEUROLOGIC:  Denies strokes. 

IMMUNOLOGIC:  No history of connective tissue disease.

 

OBJECTIVE:  VITAL SIGNS:  Afebrile, vital signs noted per the chart record. 

GENERAL:  No acute distress, alert and calm. 

HEENT:  Normocephalic, atraumatic. 

NECK:  Supple.  Throat midline. 

LUNGS:  Bilateral air entry is moderate with no significant rales, no rhonchi, 
no

wheezes. 

CARDIOVASCULAR:  S1, S2.  No murmurs, rubs, or gallops. 

ABDOMEN:  Soft, nontender. 

EXTREMITIES:  No clubbing, no cyanosis, no edema. 

INTEGUMENT:  No rash.  No purpura.

 

LABORATORY DATA:  3.9 potassium, 0.6 creatinine, 25 bicarbonate.  5 white count,
37

hematocrit, 220 platelets.  LFTs were mostly unremarkable. 

 

IMPRESSION AND PLAN:  

1. Abnormal chest radiography, right upper lobe semi-solid nodule with 
cavitation,

includes larger ground-glass penumbra. 

2. Abnormal chest radiography, dense, right middle lobe consolidation, possible

community-acquired pneumonia to rule out other including postobstructive. 

3. Abnormal chest radiography, multiple, mainly abdominal granulomatous disease.

4. Hypoxemia, improved now 92% on room air oxygen when walking.

5. Former smoker, quit about 25 years ago.

6. Hypertension.

7. History of urinary retention.

8. Hyperlipidemia.

9. History of environmental exposures, growing up with fishing commonly.

10. History of demographic exposure, grew up in [place]. 



Continue antibiotics for pneumonia as she is responding to them it seems.  The 
question

is this will be a full response versus partial response, given the complexity of

the radiographic findings and her history, which is devoid of a lot of classic 
pneumonia

symptoms.  I offered the patient an early bronchoscopy with low recommendation 
at this 

point versus watchful waiting with close followup to ensure radiography of the 
chest results.

Mind there is a soft indication for early bronchoscopy.  The patient opts for 
watchful 

waiting with insurance to make sure her chest radiograph clears or else she will
need 

further testing.  If chest x-ray does not clear up, there is a clear 
consideration for PET-CT 

and bronchoscopy at that time.  For now, continue antibiotics, ensure the 
patient clinically 

improved.  The patient is asking to go home in a quick manner, which can be 
reasonable if 

she gets close outpatient followup. 

 

Thank you very much, Dr. Cueto and Dr. Eric Daniels for allowing me a chance to

participate in the care of Ms. Blevins.  Please call for any questions. 

 

 

 

 

______________________________

MD JOSELINE Gaytan/OSCAR

D:  07/29/2019 23:25:41

T:  07/30/2019 04:45:05

Job #:  [id number]

 

MTDD

## 2019-07-30 NOTE — PROGRESS NOTE
DATE:  07/30/2019  

 

ADDENDUM:  The patient was discharged initially.  We had called [facility] ER at that

time, me and the nurse Kriss to get the blood cultures, and at that moment, they told us

that the blood cultures were not be available until tomorrow, 07/31/2019.  Blood

cultures were drawn on 07/27/2019, has been there for more than approximately 3 days.

When we spoke with the nurse, Teresa at [facility], she said that the blood

cultures will not be available until tomorrow on the 31st.  The patient was then

discharged to home.  Approximately maybe 45 minutes later, we had a call from [facility] that the blood cultures were positive for Gram-positive cocci in clusters.  At

this time, I spoke with the supervisor, Sindy and spoke with the patient's son, Amando Salazar by phone.  I discussed with them about the situation that initially if the

cultures were negative, we are going to follow up on them because the patient was eager

to being discharged to home.  At this time, he understands and he agrees to bring his

mother back.  We will work her on too.  He can bring her here as a direct admission

instead of going through the ER.  I told her I am not sure if this is a contaminant

versus a true infection.  At this time, the patient will be coming in as a direct

admission.  I will start her on IV vancomycin.  We will also get ID involved to monitor

these cultures.  We will also get repeat blood cultures.  The son, Hermes Salazar, agreed

to bring his mother in.  Initially, he said that he will bring her here in the next 1 or

2 days, but I told him that is not advisable and instead I recommended for him to come

in as soon as possible here to the [facility].  I did apologize for the

confusion with the situation.  He agreed to bring his mother into the hospital.  Once

again, cultures were positive 

after we had called and it was clearly negative earlier in the day.  Otherwise, the son

agreed to bring his mother in.  Sindy, the house nurse will be working on getting her

out of bed. 

 

 

 

 

______________________________

MD NEGRA Saldaña/OSCAR

D:  07/30/2019 15:09:03

T:  07/30/2019 16:49:12

Job #:  [id number]

## 2019-07-30 NOTE — NUR
Pulmonary Medicine  



DATE OF ENCOUNTER:  07/30/2019

  



SUBJECTIVE:  

No fevers

On ambulation she didnt qualify for home oxygen

No excess phlegm

She feels better and wishes she can go home

I discuss in presence of , son, patient.  Reviewed the need for close 
radiographic follow

 

 

REVIEW OF SYSTEMS:

no chest tightness, no bleeding

 

 

OBJECTIVE: 

VITAL SIGNS:   vital signs noted per the chart record. 

GENERAL:  No acute distress, alert and calm. 

HEENT:  Normocephalic, atraumatic. 

NECK:  Supple.  Throat midline. 

LUNGS:  Bilateral air entry is moderate with no significant rales, no rhonchi, 
no

wheezes. 

CARDIOVASCULAR:  S1, S2.  No murmurs, rubs, or gallops. 

ABDOMEN:  Soft, nontender. 

EXTREMITIES:  No clubbing, no cyanosis, no edema. 

INTEGUMENT:  No rash.  No purpura.

 

LABORATORY DATA:  

k 3.4.  cr .6 

 

IMPRESSION AND PLAN:  

1. Abnormal chest radiography, right upper lobe semi-solid nodule with 
cavitation,

with additional larger ground-glass penumbra. 

2. Abnormal chest radiography, dense right middle lobe consolidation, possible

community-acquired pneumonia,  rule out other including pneumonia types 
including postobstructive. 

3. Abnormal chest radiography, hx multiple mainly abdominal granulomas

4. Hypoxemia, improved now 92% on room air oxygen when walking.

5. Former smoker, quit about 25 years ago.

6. Hypertension.

7. History of urinary retention.

8. Hyperlipidemia.

9. History of environmental exposures, growing up with fishing commonly.

10. 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 
 working the pipelines. 



Continue antibiotics for pneumonia as she is responding to them it seems.  The 
question

is this will be a full response versus partial response, given the complexity 
of

the radiographic findings and her history. 

Patient defers early bronchoscopy, and prefers watchful waiting with close 
followup to ensure radiography of the chest results.

--If chest x-ray does not clear up, she will probably need a PET-CT and 
bronchoscopy

Consideration to go home is reasonable if she is ambulating well.  

Abx rx written 

 

Thank you very much, Dr. Cueto and Dr. Eric Daniels for allowing me a chance to

participate in the care of Ms. Blevins.  Please call for any questions.

## 2019-07-30 NOTE — NUR
PATIENT REFUSED BED ALARM. EDUCATED PATIENT THE IMPORTANCE OF BED ALARM. PATIENT SAID SHE'S 
AWAKE NOW AND SHE DIDN'T NEED BED ALARM

## 2019-07-30 NOTE — NUR
RCD BED SIDE REPORT PT RESTING ON BED NO SIGNS OF ANY DISTRESS NOTED BED LOW AND LOCKED CALL 
LIGHT IN REACH

## 2019-07-30 NOTE — NUR
approximately 1440 Dr. Cueto spoke with patient's son instructing patient needs to return to 
hospital as her cultures from [facility] ER were called to [facility] med surg 2 
nurse are positive.  family and patient agreed to return to hospital.  I spoke with son of 
patient as well.  instructed them with plan of care.  I greeted patient and family at front 
lobby, I escorted patient via wheelchair to room 202. pt safely in room on bed. family at 
bedside.  I notified the primary RN and Dr. Cueto of patient arrival to room.  Primary nurse 
aware of new orders by MD.

## 2019-07-30 NOTE — NUR
PATIENT REFUSES BED ALARM. EDUCATED PATIENT ABOUT THE IMPORTANT OF BED ALARM. PATIENT SAID 
SHE DIDN'T LIKE THE LOUD NOISE EVERY TIME SHE GOT UP. NOTIFIED CHARGE NURSE

## 2019-07-30 NOTE — CONSULTATION
DATE OF CONSULTATION:    

 

REASON FOR CONSULTATION:  Ms. Blevins is here because of pneumonia and bacteremia.

 

HISTORY OF PRESENT ILLNESS:  This patient was recently in the hospital with pneumonia,

apparently she presented to an outpatient facility.  She had fever and chills.  She was

diagnosed with pneumonia.  She was discharged home, but the Urgent Care cultures are

showing gram-positive cocci, so the patient was called back.  The patient is doing well

at present time. 

 

REVIEW OF SYSTEMS:

HEENT:  Negative. 

PULMONARY:  Negative. 

CARDIAC:  Negative. 

GENITOURINARY:  Negative. 

SKIN:  There is no rash.

 

PHYSICAL EXAMINATION:

GENERAL:  She is currently alert, oriented, does not seem to be in acute distress. 

VITAL SIGNS:  Stable.  Currently afebrile. 

HEENT:  She is not icteric. 

NECK:  Supple. 

CHEST:  Clear. 

HEART:  S1, S2.  No murmur. 

ABDOMEN:  Soft.  Bowel sounds present. 

EXTREMITIES:  There is no edema. 

SKIN:  No rash.

IMPRESSION:  

1. Bacteremia.  It is unknown if it is through contamination.  I will put her on

vancomycin. 

2. Community-acquired pneumonia, clinically seems better.  We will give vancomycin and

cefepime for now until we get the identification of bacteria.  Discussed with the

patient of plans, discussed with the . 

3. Chart reviewed.  Her labs reviewed.

 

 

 

 

______________________________

MD DEON Amador/OSCAR

D:  07/30/2019 16:43:23

T:  07/30/2019 17:02:01

Job #:  [id number]

## 2019-07-30 NOTE — NUR
NURSE GIVEN THE URINE CS REPORT GRAM POSITIVE COCI IN CLUSTERS  NOTIFIED DR HASTINGS HE SAID 
CALL THE FAMILY TALKED TO SON TOLD HIM TO COME TO ER AND GAVE DR FLANAGAN NUMBER

-------------------------------------------------------------------------------

Addendum: 07/30/19 at 1827 by Kriss Reyes RN

-------------------------------------------------------------------------------

NOT URINE CULTURE ITS BLOOD CULTURE

## 2019-07-30 NOTE — NUR
RE ADMITTED THE PATIENT DUE TO URINE CULTURE REPORT POSITIVE  PT ALERT AND ORIENTED VITALS 
CHECKED  FAMILY AT BED SIDE NEW IV STARTED ON THE LEFT FOREARM WITH 20 G ,BED LOW AND LOCKED 
CALL LIGHT IN REACH

-------------------------------------------------------------------------------

Addendum: 07/30/19 at 1827 by Kriss Reyes RN

-------------------------------------------------------------------------------

NOT URINE CULTURE BLOOD CULTURE

## 2019-07-31 VITALS — SYSTOLIC BLOOD PRESSURE: 121 MMHG | DIASTOLIC BLOOD PRESSURE: 66 MMHG

## 2019-07-31 VITALS — SYSTOLIC BLOOD PRESSURE: 120 MMHG | DIASTOLIC BLOOD PRESSURE: 75 MMHG

## 2019-07-31 VITALS — SYSTOLIC BLOOD PRESSURE: 126 MMHG | DIASTOLIC BLOOD PRESSURE: 58 MMHG

## 2019-07-31 VITALS — SYSTOLIC BLOOD PRESSURE: 135 MMHG | DIASTOLIC BLOOD PRESSURE: 64 MMHG

## 2019-07-31 VITALS — DIASTOLIC BLOOD PRESSURE: 72 MMHG | SYSTOLIC BLOOD PRESSURE: 132 MMHG

## 2019-07-31 VITALS — SYSTOLIC BLOOD PRESSURE: 127 MMHG | DIASTOLIC BLOOD PRESSURE: 60 MMHG

## 2019-07-31 VITALS — SYSTOLIC BLOOD PRESSURE: 134 MMHG | DIASTOLIC BLOOD PRESSURE: 60 MMHG

## 2019-07-31 VITALS — DIASTOLIC BLOOD PRESSURE: 80 MMHG | SYSTOLIC BLOOD PRESSURE: 131 MMHG

## 2019-07-31 VITALS — DIASTOLIC BLOOD PRESSURE: 112 MMHG | SYSTOLIC BLOOD PRESSURE: 136 MMHG

## 2019-07-31 VITALS — DIASTOLIC BLOOD PRESSURE: 91 MMHG | SYSTOLIC BLOOD PRESSURE: 110 MMHG

## 2019-07-31 VITALS — SYSTOLIC BLOOD PRESSURE: 135 MMHG | DIASTOLIC BLOOD PRESSURE: 77 MMHG

## 2019-07-31 VITALS — DIASTOLIC BLOOD PRESSURE: 76 MMHG | SYSTOLIC BLOOD PRESSURE: 137 MMHG

## 2019-07-31 VITALS — DIASTOLIC BLOOD PRESSURE: 62 MMHG | SYSTOLIC BLOOD PRESSURE: 123 MMHG

## 2019-07-31 VITALS — SYSTOLIC BLOOD PRESSURE: 123 MMHG | DIASTOLIC BLOOD PRESSURE: 57 MMHG

## 2019-07-31 VITALS — DIASTOLIC BLOOD PRESSURE: 65 MMHG | SYSTOLIC BLOOD PRESSURE: 132 MMHG

## 2019-07-31 VITALS — DIASTOLIC BLOOD PRESSURE: 84 MMHG | SYSTOLIC BLOOD PRESSURE: 135 MMHG

## 2019-07-31 VITALS — SYSTOLIC BLOOD PRESSURE: 117 MMHG | DIASTOLIC BLOOD PRESSURE: 55 MMHG

## 2019-07-31 VITALS — SYSTOLIC BLOOD PRESSURE: 126 MMHG | DIASTOLIC BLOOD PRESSURE: 67 MMHG

## 2019-07-31 VITALS — DIASTOLIC BLOOD PRESSURE: 69 MMHG | SYSTOLIC BLOOD PRESSURE: 134 MMHG

## 2019-07-31 VITALS — DIASTOLIC BLOOD PRESSURE: 62 MMHG | SYSTOLIC BLOOD PRESSURE: 124 MMHG

## 2019-07-31 VITALS — SYSTOLIC BLOOD PRESSURE: 111 MMHG | DIASTOLIC BLOOD PRESSURE: 96 MMHG

## 2019-07-31 VITALS — SYSTOLIC BLOOD PRESSURE: 114 MMHG | DIASTOLIC BLOOD PRESSURE: 58 MMHG

## 2019-07-31 VITALS — DIASTOLIC BLOOD PRESSURE: 81 MMHG | SYSTOLIC BLOOD PRESSURE: 134 MMHG

## 2019-07-31 VITALS — SYSTOLIC BLOOD PRESSURE: 143 MMHG | DIASTOLIC BLOOD PRESSURE: 59 MMHG

## 2019-07-31 VITALS — DIASTOLIC BLOOD PRESSURE: 59 MMHG | SYSTOLIC BLOOD PRESSURE: 143 MMHG

## 2019-07-31 VITALS — DIASTOLIC BLOOD PRESSURE: 54 MMHG | SYSTOLIC BLOOD PRESSURE: 123 MMHG

## 2019-07-31 VITALS — SYSTOLIC BLOOD PRESSURE: 135 MMHG | DIASTOLIC BLOOD PRESSURE: 72 MMHG

## 2019-07-31 VITALS — SYSTOLIC BLOOD PRESSURE: 123 MMHG | DIASTOLIC BLOOD PRESSURE: 59 MMHG

## 2019-07-31 VITALS — SYSTOLIC BLOOD PRESSURE: 117 MMHG | DIASTOLIC BLOOD PRESSURE: 67 MMHG

## 2019-07-31 VITALS — DIASTOLIC BLOOD PRESSURE: 83 MMHG | SYSTOLIC BLOOD PRESSURE: 124 MMHG

## 2019-07-31 VITALS — DIASTOLIC BLOOD PRESSURE: 65 MMHG | SYSTOLIC BLOOD PRESSURE: 136 MMHG

## 2019-07-31 VITALS — SYSTOLIC BLOOD PRESSURE: 133 MMHG | DIASTOLIC BLOOD PRESSURE: 61 MMHG

## 2019-07-31 VITALS — SYSTOLIC BLOOD PRESSURE: 120 MMHG | DIASTOLIC BLOOD PRESSURE: 60 MMHG

## 2019-07-31 LAB
ANION GAP SERPL CALC-SCNC: 15.1 MMOL/L (ref 8–16)
BASOPHILS # BLD AUTO: 0 10*3/UL (ref 0–0.1)
BASOPHILS NFR BLD AUTO: 0.8 % (ref 0–1)
BUN SERPL-MCNC: 12 MG/DL (ref 7–26)
BUN/CREAT SERPL: 19 (ref 6–25)
CALCIUM SERPL-MCNC: 9.3 MG/DL (ref 8.4–10.2)
CHLORIDE SERPL-SCNC: 104 MMOL/L (ref 98–107)
CO2 SERPL-SCNC: 25 MMOL/L (ref 22–29)
DEPRECATED NEUTROPHILS # BLD AUTO: 3.2 10*3/UL (ref 2.1–6.9)
EGFRCR SERPLBLD CKD-EPI 2021: > 60 ML/MIN (ref 60–?)
EOSINOPHIL # BLD AUTO: 0.2 10*3/UL (ref 0–0.4)
EOSINOPHIL NFR BLD AUTO: 4.1 % (ref 0–6)
ERYTHROCYTE [DISTWIDTH] IN CORD BLOOD: 13.2 % (ref 11.7–14.4)
GLUCOSE SERPLBLD-MCNC: 111 MG/DL (ref 74–118)
HCT VFR BLD AUTO: 39.4 % (ref 34.2–44.1)
HGB BLD-MCNC: 12.3 G/DL (ref 12–16)
LYMPHOCYTES # BLD: 1.3 10*3/UL (ref 1–3.2)
LYMPHOCYTES NFR BLD AUTO: 25 % (ref 18–39.1)
MCH RBC QN AUTO: 27.3 PG (ref 28–32)
MCHC RBC AUTO-ENTMCNC: 31.2 G/DL (ref 31–35)
MCV RBC AUTO: 87.6 FL (ref 81–99)
MONOCYTES # BLD AUTO: 0.4 10*3/UL (ref 0.2–0.8)
MONOCYTES NFR BLD AUTO: 8.2 % (ref 4.4–11.3)
NEUTS SEG NFR BLD AUTO: 61.3 % (ref 38.7–80)
PLATELET # BLD AUTO: 248 X10E3/UL (ref 140–360)
POTASSIUM SERPL-SCNC: 4.1 MMOL/L (ref 3.5–5.1)
RBC # BLD AUTO: 4.5 X10E6/UL (ref 3.6–5.1)
SODIUM SERPL-SCNC: 140 MMOL/L (ref 136–145)

## 2019-07-31 RX ADMIN — ENOXAPARIN SODIUM SCH MG: 40 INJECTION SUBCUTANEOUS at 16:10

## 2019-07-31 RX ADMIN — VANCOMYCIN HYDROCHLORIDE SCH MLS/HR: 1 INJECTION, SOLUTION INTRAVENOUS at 15:07

## 2019-07-31 RX ADMIN — METOPROLOL TARTRATE SCH MG: 25 TABLET, FILM COATED ORAL at 16:10

## 2019-07-31 RX ADMIN — VANCOMYCIN HYDROCHLORIDE SCH MLS/HR: 1 INJECTION, SOLUTION INTRAVENOUS at 03:20

## 2019-07-31 RX ADMIN — AMLODIPINE BESYLATE SCH MG: 5 TABLET ORAL at 09:00

## 2019-07-31 RX ADMIN — CEFEPIME HYDROCHLORIDE SCH MLS/HR: 1 INJECTION, POWDER, FOR SOLUTION INTRAMUSCULAR; INTRAVENOUS at 14:14

## 2019-07-31 RX ADMIN — CEFEPIME HYDROCHLORIDE SCH MLS/HR: 1 INJECTION, POWDER, FOR SOLUTION INTRAMUSCULAR; INTRAVENOUS at 22:02

## 2019-07-31 RX ADMIN — CEFEPIME HYDROCHLORIDE SCH MLS/HR: 1 INJECTION, POWDER, FOR SOLUTION INTRAMUSCULAR; INTRAVENOUS at 05:20

## 2019-07-31 RX ADMIN — DILTIAZEM HYDROCHLORIDE SCH MLS/HR: 100 INJECTION, POWDER, LYOPHILIZED, FOR SOLUTION INTRAVENOUS at 16:45

## 2019-07-31 RX ADMIN — METOPROLOL TARTRATE SCH MG: 25 TABLET, FILM COATED ORAL at 09:00

## 2019-07-31 RX ADMIN — ATORVASTATIN CALCIUM SCH MG: 20 TABLET, FILM COATED ORAL at 22:02

## 2019-07-31 NOTE — NUR
Patient arrived to . Patient running sinus rhythm with occasional pvc's. Patient does 
not appear to be in any s/s of distress. Dr. Gongora informed of patients arrival to ICU and 
rhythm/Vital signs. MD gave orders to hold cardizem drip for now.

## 2019-07-31 NOTE — NUR
PAGED AND TALKED DR JEROME RAYMUNDO THE CONSULTATION GOT THE ORDER TO TRANSFER TO ICU AND START 
CARDIZEM DRIP

## 2019-07-31 NOTE — NUR
Pulmonary Medicine  



DATE OF ENCOUNTER:  07/31/2019

  



SUBJECTIVE:  

Patient had late call and of positive blood cultures.  Preliminary suggests 
gram-positive cocci.  I discussed with primary noting that patient with cavitary
 nodular pneumonitis, needs further antibiotics.  Patient continues on room 
air FiO2.  She feels stable compared to yesterday.

 

REVIEW OF SYSTEMS:

no chest tightness, no bleeding

 

 

OBJECTIVE: 

VITAL SIGNS:   vital signs noted per the chart record. 

GENERAL:  No acute distress, alert and calm. 

HEENT:  Normocephalic, atraumatic. 

NECK:  Supple.  Throat midline. 

LUNGS:  Bilateral air entry is moderate with no significant rales, no rhonchi, 
no

wheezes. 

CARDIOVASCULAR:  S1, S2.  No murmurs, rubs, or gallops. 

ABDOMEN:  Soft, nontender. 

EXTREMITIES:  No clubbing, no cyanosis, no edema. 

INTEGUMENT:  No rash.  No purpura.

 

LABORATORY DATA:  

5 white count: 39 hematocrit, 248 platelets.

 

IMPRESSION AND PLAN:  

1. Abnormal chest radiography, right upper lobe semi-solid nodule with 
cavitation,

with additional larger ground-glass penumbra. 

2. Abnormal chest radiography, dense right middle lobe consolidation, possible

community-acquired pneumonia,  rule out other including pneumonia types 
including postobstructive, Cavitary staphylococcal pneumonia. 

3. Abnormal chest radiography, hx multiple mainly abdominal granulomas

4. Hypoxemia, improved now 92% on room air oxygen when walking.

5. Former smoker, quit about 25 years ago.

6. Hypertension.

7. History of urinary retention.

8. Hyperlipidemia.

9. History of environmental exposures, growing up with fishing commonly.

10. 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 
 working the pipelines. 



Continue antibiotics for pneumonia as she is responding to them it seems.  The 
question

is this will be a full response versus partial response, given the complexity 
of

the radiographic findings and her history. 

Patient defers early bronchoscopy, and prefers watchful waiting with close 
followup to ensure radiography of the chest results.

--If chest x-ray does not clear up, she will probably need a PET-CT and 
bronchoscopy

Follow-up outside blood cultures for identification and sensitivity

Continue mobilizing 

 

Thank you very much, Dr. Cueto and Dr. Eric Daniels for allowing me a chance to

participate in the care of Ms. Blevins.  Please call for any questions.

## 2019-07-31 NOTE — NUR
RCD PT AT BED PT IS ALERT AND ORIENTED PT RESTING ON BED FAMILY AT BED SIDE  IV PATENT BY 
SALINE FLUSH BED LOW AND LOCKED CALL LIGHT IN REACH

## 2019-07-31 NOTE — NUR
1613- Patient went into SVT HR in 160's. Stat EKG ordered. 

1625- Dr. Gongora informed of EKG results, current vital signs and what was witnessed by RN on 
the monitor. Orders given for Cardizem drip. Will continue to monitor the patient.

## 2019-07-31 NOTE — NUR
PT HAVE SVT AND  PAGED AND NOTIFIED DR HASTINGS GOT NEW ORDERS CARDIZEM 5 MG IV AND 
CARDIOLOGY CONSULT

## 2019-07-31 NOTE — DISCHARGE SUMMARY
FINAL DISCHARGE DIAGNOSES:  

1. History of hypoxemic respiratory distress, secondary to multifocal pneumonia --

resolved. 

2. Community-acquired pneumonia.

3. Hypertension.

4. Hyperlipidemia.

5. Left bundle branch block, cleared for discharge by Cardiology.

 

CONSULTANTS:  Cardiology and Pulmonary.

 

PHYSICAL EXAMINATION:

VITAL SIGNS:  Temperature 96.4, pulse 80, respiratory rate is 19, blood pressure 141/76,

pulse ox 96% on room air. 

LABORATORY FINDINGS:  Show white count of 6, hemoglobin of 12, hematocrit is 38,

platelets of 248. Chemistry, sodium 141, potassium 3.1, chloride 103, bicarb 25, anion

gap of 16. BUN is 7, creatinine is 0.59, glucose 71, calcium 9.1.  Troponins were

negative. 

 

MICROBIOLOGY:  Sputum cultures are negative.  Blood cultures done in the outside ER are

negative. 

 

IMAGING STUDIES:  CT of the chest shows no evidence of pulmonary embolism.  Shows also

evidence of right middle lobe pneumonia.  There is a 1.6 cm nodular density with

associated linear opacities in adjacent substernal space.  Focal bronchiectasis, likely

represents chronic postinfectious scarring.  There is a small right-sided pleural

effusion.  Moderate central bronchial wall-thickening in upper and lower lobes likely

resonance __________ measuring 2 mm.  Calcified mediastinal right hilar nodes in splenic

and hepatic granulomatous disease.  She is advised to follow up as an outpatient with

Pulmonary as well as other further imaging as an outpatient with her PCP.  X-ray of the

knees were negative for any fractures. 

 

HOSPITAL COURSE:  This is an 84-year-old  female, who came in from an outside

ER due to underlying cough, congestion, and hypoxia.  Apparently, the patient has been

dealing with this cough and congestion for several days prior to arrival to the outside

ER.  The patient was admitted and transferred to [facility] for further

management and care.  Imaging studies was consistent with a right middle lobe pneumonia.

 The patient maintained on broad-spectrum IV antibiotics while here in the hospital

stay.  CT imaging with contrast with results above shows no evidence of pulmonary

embolism.  It does show evidence of a right middle lobe pneumonia.  Blood cultures were

negative.  The patient is maintained on IV antibiotics and Pulmonary was consulted.  At

this time per Pulmonary, the patient what needed is a bronchoscopy, but the patient

refused at this time.  They have agreed to follow up as an outpatient.  Oral Augmentin

was written for discharge to home.  The patient was cleared for discharge by Pulmonary.

In relation to her left bundle branch block, a 2D echo was performed, showed an EF of

35% to 40%.  At this time, the patient was cleared by Cardiology for discharge home,

needs to follow up with Cardiology as an outpatient.  On the day of discharge, vital

signs were stable, labs reviewed and stable.  The patient was evaluated and examined

thoroughly on the day of discharge.  No other complaints.  The patient verbalized

understanding and agrees to plan of care to follow up appointment as an outpatient with

primary care physician in 1 week and pulmonologist and cardiologist in 2 weeks' time.

The patient did not qualify for home O2 and states she was saturating on room air. 

 

MEDICATIONS:  See med reconciliation form including the Augmentin or antibiotic.

 

DISPOSITION:  To home.

 

CONDITION:  Stable.

 

DIET:  Heart healthy. 

 

In the event of any worsening symptoms, the patient was advised to come back to the ED

for further evaluation. 

 

Discharge summary took greater than 35 minutes.

 

 

 

 

______________________________

MD NEGRA Saldaña/OSCAR

D:  07/30/2019 13:54:09

T:  07/31/2019 04:43:44

Job #:  [id number]

## 2019-07-31 NOTE — PROGRESS NOTE
DATE:  07/31/2019

 

Medicine Progress Note 

 

SUBJECTIVE:  The patient was readmitted back to the hospital after we had discharged the

patient after calling __________ and at that time, the preliminary blood cultures were

not available until 07/31/2019, despite blood cultures were drawn 4 days prior to that.

Soon after the patient was discharged, the [facility] ER called us with preliminary

gram-positive cocci in clusters and which we had call to bring the patient back.  The

patient's son Hermes Blevins, I spoke with him and he brought his mother back to the

hospital.  The patient was admitted under the same condition.  The patient was then

admitted and ID, Dr. Valle was reconsulted.  Today, the patient developed some elevated

heart rate.  It seems to be on cardiac telemetry that it is sinus tachycardia.  Due to

the concern, the patient was sent to the ICU and Cardiology was notified.  The patient

did not start on Cardizem drip.  Instead, she is currently doing much better when I

evaluated her in the ICU. 

 

LABORATORY FINDINGS:  Show white count 5.1, hemoglobin 12, hematocrit is 39, platelets

of 248.  Chemistry, sodium 140, potassium 4.1, chloride 104, bicarb 25, anion gap of 15.

BUN is 12, creatinine 0.63, glucose is 109, calcium is 9.3, magnesium is 1.6. 

 

MICROBIOLOGY:  Blood cultures are pending.

 

IMAGING STUDIES:  None.  Blood cultures at [facility] ER was gram-positive cocci in

clusters, but pending final results. 

 

PHYSICAL EXAMINATION:

VITAL SIGNS:  Temperature is 96.9, pulse is 85.  She did have a heart rate of 152

recorded.  Blood pressure is 134/69, pulse ox is 92% on 2 L nasal cannula 

GENERAL:  Not in acute distress.  Alert and oriented x3.  Cooperative on examination. 

HEENT:  Head is normocephalic and atraumatic.  Eyes, pupils are equal, round, and

reactive to light bilaterally.  Extraocular movements are intact bilaterally.  Throat,

no evidence of erythema or exudates in the posterior pharynx.  Has poor dentition. 

NECK:  Supple.  Good range of motion. 

PULMONARY:  Clear to auscultation bilaterally.  No wheezing, no rales, no rhonchi, no

crackles appreciated. 

CARDIOVASCULAR:  Positive S1, S2.  No murmurs, rubs, or gallops appreciated. 

ABDOMEN:  Soft, nondistended, and nontender to palpation.  Bowel sounds present. 

MUSCULOSKELETAL:  Strength is 5/5 throughout.  No evidence of muscles deficits on

examination.  No weakness appreciated. 

NEUROLOGIC:  Cranial nerves 2 through 12 are grossly intact.  No evidence of

neurological deficits on exam. 

SKIN:  Intact.  Warm to touch.  Good cap refill. 

PSYCHIATRIC:  Normal affect and mood. 

EXTREMITIES:  No edema.  Good range of motion throughout.

IMPRESSION:  

1. Acute hypoxemic respiratory distress, secondary to multifocal pneumonia -- still

currently on nasal cannula. 

2. Community-acquired pneumonia, on antibiotics.

3. Hypertension.

4. Hyperlipidemia.

5. Left bundle branch block.

6. Sinus tachycardia.

7. Positive bacteremia.

 

PLAN:  At this time, the patient is currently on IV antibiotics, IV vancomycin.  ID is

consulted to follow.  So far, the blood cultures are still from the outside facility,

which shows gram-positive cocci in clusters and awaiting final identification.  It may

be a contaminant altogether.  The patient developed sinus tachycardia and which

Cardiology was consulted.  Pulmonary is following as well.  We are going to continue

with same plan of care, same medications.  No changes.  I did discuss with the patient

and the family at bedside the reason for coming back to the hospital due to the fact

that the blood cultures were positive. 

 

 

 

 

______________________________

MD NEGRA Saldaña/OSCAR

D:  07/31/2019 16:28:09

T:  07/31/2019 18:39:52

Job #:  [id number]

## 2019-07-31 NOTE — NUR
Called [facility] in [place] to obtain final blood culture results. Was informed by 
RN that final results are not available yet, only the preliminary results. Dr. Cueto informed 
of situation.

## 2019-08-01 VITALS — SYSTOLIC BLOOD PRESSURE: 141 MMHG | DIASTOLIC BLOOD PRESSURE: 73 MMHG

## 2019-08-01 VITALS — SYSTOLIC BLOOD PRESSURE: 124 MMHG | DIASTOLIC BLOOD PRESSURE: 90 MMHG

## 2019-08-01 VITALS — SYSTOLIC BLOOD PRESSURE: 160 MMHG | DIASTOLIC BLOOD PRESSURE: 91 MMHG

## 2019-08-01 VITALS — SYSTOLIC BLOOD PRESSURE: 123 MMHG | DIASTOLIC BLOOD PRESSURE: 54 MMHG

## 2019-08-01 VITALS — SYSTOLIC BLOOD PRESSURE: 132 MMHG | DIASTOLIC BLOOD PRESSURE: 65 MMHG

## 2019-08-01 VITALS — DIASTOLIC BLOOD PRESSURE: 67 MMHG | SYSTOLIC BLOOD PRESSURE: 133 MMHG

## 2019-08-01 VITALS — SYSTOLIC BLOOD PRESSURE: 142 MMHG | DIASTOLIC BLOOD PRESSURE: 67 MMHG

## 2019-08-01 VITALS — SYSTOLIC BLOOD PRESSURE: 119 MMHG | DIASTOLIC BLOOD PRESSURE: 57 MMHG

## 2019-08-01 VITALS — SYSTOLIC BLOOD PRESSURE: 113 MMHG | DIASTOLIC BLOOD PRESSURE: 49 MMHG

## 2019-08-01 VITALS — SYSTOLIC BLOOD PRESSURE: 140 MMHG | DIASTOLIC BLOOD PRESSURE: 73 MMHG

## 2019-08-01 VITALS — SYSTOLIC BLOOD PRESSURE: 132 MMHG | DIASTOLIC BLOOD PRESSURE: 71 MMHG

## 2019-08-01 VITALS — DIASTOLIC BLOOD PRESSURE: 61 MMHG | SYSTOLIC BLOOD PRESSURE: 111 MMHG

## 2019-08-01 VITALS — SYSTOLIC BLOOD PRESSURE: 121 MMHG | DIASTOLIC BLOOD PRESSURE: 68 MMHG

## 2019-08-01 VITALS — SYSTOLIC BLOOD PRESSURE: 126 MMHG | DIASTOLIC BLOOD PRESSURE: 67 MMHG

## 2019-08-01 VITALS — SYSTOLIC BLOOD PRESSURE: 85 MMHG | DIASTOLIC BLOOD PRESSURE: 75 MMHG

## 2019-08-01 VITALS — SYSTOLIC BLOOD PRESSURE: 139 MMHG | DIASTOLIC BLOOD PRESSURE: 67 MMHG

## 2019-08-01 VITALS — SYSTOLIC BLOOD PRESSURE: 128 MMHG | DIASTOLIC BLOOD PRESSURE: 63 MMHG

## 2019-08-01 VITALS — SYSTOLIC BLOOD PRESSURE: 119 MMHG | DIASTOLIC BLOOD PRESSURE: 73 MMHG

## 2019-08-01 VITALS — DIASTOLIC BLOOD PRESSURE: 53 MMHG | SYSTOLIC BLOOD PRESSURE: 120 MMHG

## 2019-08-01 VITALS — SYSTOLIC BLOOD PRESSURE: 94 MMHG | DIASTOLIC BLOOD PRESSURE: 59 MMHG

## 2019-08-01 VITALS — DIASTOLIC BLOOD PRESSURE: 84 MMHG | SYSTOLIC BLOOD PRESSURE: 136 MMHG

## 2019-08-01 VITALS — DIASTOLIC BLOOD PRESSURE: 86 MMHG | SYSTOLIC BLOOD PRESSURE: 103 MMHG

## 2019-08-01 VITALS — DIASTOLIC BLOOD PRESSURE: 48 MMHG | SYSTOLIC BLOOD PRESSURE: 123 MMHG

## 2019-08-01 LAB
ANION GAP SERPL CALC-SCNC: 17.9 MMOL/L (ref 8–16)
BASOPHILS # BLD AUTO: 0.1 10*3/UL (ref 0–0.1)
BASOPHILS NFR BLD AUTO: 1.1 % (ref 0–1)
BUN SERPL-MCNC: 8 MG/DL (ref 7–26)
BUN/CREAT SERPL: 14 (ref 6–25)
CALCIUM SERPL-MCNC: 9.5 MG/DL (ref 8.4–10.2)
CHLORIDE SERPL-SCNC: 102 MMOL/L (ref 98–107)
CO2 SERPL-SCNC: 25 MMOL/L (ref 22–29)
DEPRECATED NEUTROPHILS # BLD AUTO: 4 10*3/UL (ref 2.1–6.9)
EGFRCR SERPLBLD CKD-EPI 2021: > 60 ML/MIN (ref 60–?)
EOSINOPHIL # BLD AUTO: 0.2 10*3/UL (ref 0–0.4)
EOSINOPHIL NFR BLD AUTO: 3.5 % (ref 0–6)
ERYTHROCYTE [DISTWIDTH] IN CORD BLOOD: 13.2 % (ref 11.7–14.4)
GLUCOSE SERPLBLD-MCNC: 87 MG/DL (ref 74–118)
HCT VFR BLD AUTO: 40.7 % (ref 34.2–44.1)
HGB BLD-MCNC: 12.9 G/DL (ref 12–16)
LYMPHOCYTES # BLD: 1.6 10*3/UL (ref 1–3.2)
LYMPHOCYTES NFR BLD AUTO: 24.7 % (ref 18–39.1)
MAGNESIUM SERPL-MCNC: 1.7 MG/DL (ref 1.3–2.1)
MCH RBC QN AUTO: 27.6 PG (ref 28–32)
MCHC RBC AUTO-ENTMCNC: 31.7 G/DL (ref 31–35)
MCV RBC AUTO: 87.2 FL (ref 81–99)
MONOCYTES # BLD AUTO: 0.5 10*3/UL (ref 0.2–0.8)
MONOCYTES NFR BLD AUTO: 8 % (ref 4.4–11.3)
NEUTS SEG NFR BLD AUTO: 61.9 % (ref 38.7–80)
PLATELET # BLD AUTO: 278 X10E3/UL (ref 140–360)
POTASSIUM SERPL-SCNC: 3.9 MMOL/L (ref 3.5–5.1)
RBC # BLD AUTO: 4.67 X10E6/UL (ref 3.6–5.1)
SODIUM SERPL-SCNC: 141 MMOL/L (ref 136–145)

## 2019-08-01 RX ADMIN — DILTIAZEM HYDROCHLORIDE SCH MLS/HR: 100 INJECTION, POWDER, LYOPHILIZED, FOR SOLUTION INTRAVENOUS at 16:45

## 2019-08-01 RX ADMIN — CEFEPIME HYDROCHLORIDE SCH MLS/HR: 1 INJECTION, POWDER, FOR SOLUTION INTRAMUSCULAR; INTRAVENOUS at 05:10

## 2019-08-01 RX ADMIN — ENOXAPARIN SODIUM SCH MG: 40 INJECTION SUBCUTANEOUS at 16:34

## 2019-08-01 RX ADMIN — ATORVASTATIN CALCIUM SCH MG: 20 TABLET, FILM COATED ORAL at 20:21

## 2019-08-01 RX ADMIN — VANCOMYCIN HYDROCHLORIDE SCH MLS/HR: 1 INJECTION, SOLUTION INTRAVENOUS at 16:31

## 2019-08-01 RX ADMIN — CEFEPIME HYDROCHLORIDE SCH MLS/HR: 1 INJECTION, POWDER, FOR SOLUTION INTRAMUSCULAR; INTRAVENOUS at 21:30

## 2019-08-01 RX ADMIN — VANCOMYCIN HYDROCHLORIDE SCH MLS/HR: 1 INJECTION, SOLUTION INTRAVENOUS at 06:38

## 2019-08-01 RX ADMIN — METOPROLOL TARTRATE SCH MG: 25 TABLET, FILM COATED ORAL at 16:34

## 2019-08-01 RX ADMIN — CEFEPIME HYDROCHLORIDE SCH MLS/HR: 1 INJECTION, POWDER, FOR SOLUTION INTRAMUSCULAR; INTRAVENOUS at 15:20

## 2019-08-01 RX ADMIN — METOPROLOL TARTRATE SCH MG: 25 TABLET, FILM COATED ORAL at 08:13

## 2019-08-01 RX ADMIN — AMLODIPINE BESYLATE SCH MG: 5 TABLET ORAL at 08:11

## 2019-08-01 NOTE — NUR
Patient desats to 85% when on room air. Patient states they do not normally wear oxygen at 
home. Will continue to monitor the patient. Placed back on NC 3L

## 2019-08-01 NOTE — NUR
Pulmonary Medicine  



DATE OF ENCOUNTER:  08/01/2019

  



SUBJECTIVE:  

Some complicating SVT reported.  Patient moved to ICU.  On cardizem 7/hr iv.  3 
L/min oxygen, 93% saturation.

 

REVIEW OF SYSTEMS:

no chest tightness, no bleeding

 

 

OBJECTIVE: 

VITAL SIGNS:   vital signs noted per the chart record. 

GENERAL:  No acute distress, alert and calm. 

HEENT:  Normocephalic, atraumatic. 

NECK:  Supple.  Throat midline. 

LUNGS:  Bilateral air entry is moderate with no significant rales, no rhonchi, 
no

wheezes. 

CARDIOVASCULAR:  S1, S2.  No murmurs, rubs, or gallops. 

ABDOMEN:  Soft, nontender. 

EXTREMITIES:  No clubbing, no cyanosis, no edema. 

INTEGUMENT:  No rash.  No purpura.

 

LABORATORY DATA:  

3.9 k, cr .6.  6.5 wbc, 41 hct, 278 plt

 

IMPRESSION AND PLAN:  

1. Abnormal chest radiography, right upper lobe semi-solid nodule with 
cavitation,

with additional larger ground-glass penumbra. 

2. Abnormal chest radiography, dense right middle lobe consolidation, possible

community-acquired pneumonia,  rule out other including pneumonia types 
including postobstructive, Cavitary staphylococcal pneumonia. 

3. Abnormal chest radiography, hx multiple mainly abdominal granulomas

4. Hypoxemia, improved now 92% on room air oxygen when walking.

5. Former smoker, quit about 25 years ago.

6. Hypertension.

7. History of urinary retention.

8. Hyperlipidemia.

9. History of environmental exposures, growing up with fishing commonly.

10. 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 
 working the pipelines. 



Continue antibiotics for pneumonia as she is responding to them it seems.  

--Await knowledge if this will be a full response versus partial response, 
given the complexity of

the radiographic findings and her history. 

Patient defers early bronchoscopy, and prefers watchful waiting with close 
followup to ensure radiography of the chest results.

--If chest x-ray does not clear up, she will probably need a PET-CT and 
bronchoscopy

Follow-up outside blood cultures for identification and sensitivity.  No 
updates today PM on follow up call to lab.

SVT treatment per cardiology

Continue mobilizing 

 

Thank you very much, Dr. Cueto and Dr. Eric Daniels for allowing me a chance to

participate in the care of Ms. Blevins.  Please call for any questions.

## 2019-08-01 NOTE — PROGRESS NOTE
DATE:  08/01/2019

 

Medicine Progress Note 

 

SUBJECTIVE:  The patient is doing well today with no complaints.  She did go into a bout

of SVT, now started on Cardizem drip.  Cardiology is following.  Blood cultures from the

outside facility are still pending, final growth and identification of sensitivities. 

 

PHYSICAL EXAMINATION:

VITAL SIGNS:  Temperature is 98.2, pulse 73, respiratory rate is 23, blood pressure

132/65, and pulse ox is 94% on 2 L nasal cannula. 

GENERAL:  Not in acute distress.  Alert and oriented x3.  Cooperative on examination. 

HEENT:  Head; normocephalic, atraumatic.  Eyes; pupils are equal, round, and reactive to

light bilaterally.  Extraocular movements intact bilaterally.  Throat; no evidence of

erythema or exudates in the posterior pharynx.  Has poor dentition. 

NECK:  Supple.  Good range of motion. 

PULMONARY:  Clear to auscultation bilaterally.  No wheezing, rhonchi, or crackles

appreciated. 

CARDIOVASCULAR:  Positive S1 and S2.  No murmurs, rubs, or gallops appreciated. 

ABDOMEN:  Soft, nondistended, and nontender to palpation.  Bowel sounds present. 

MUSCULOSKELETAL:  Strength is 5/5 throughout.  No evidence of any muscle deficits on

examination.  No weakness appreciated. 

NEUROLOGIC:  Cranial nerves II through XII grossly intact.  No evidence of any

neurological deficits on exam. 

SKIN:  Intact.  Warm to touch.  Good cap refill. 

PSYCHIATRIC:  Normal affect and mood. 

EXTREMITIES:  No edema.  Good range of motion throughout.

LABORATORY DATA:  CBC reviewed and stable.  Chemistry reviewed and stable.

 

MICROBIOLOGY:  Blood cultures from here is no growth, but the outside ER is pending.

Cultures negative. 

 

IMPRESSION:  

1. Acute hypoxemic respiratory distress secondary to multifocal pneumonia, on nasal

cannula. 

2. Community-acquired pneumonia, on antibiotics.

3. Hypertension.

4. Hyperlipidemia.

5. Left bundle branch block.

6. Supraventricular tachycardia.

7. Positive bacteremia from an outside ER.

 

PLAN:  At this time, continue with IV antibiotics for now.  Blood cultures at this

facility were found to be negative at this time.  Awaiting for outside [facility]

blood cultures.  Neurosurgery called to verify, currently not available at this time.

Blood pressure is well controlled.  In relation to her SVT, the patient was started on

Cardizem drip by Cardiology.  We will continue same plan of care.  Follow up with the

cultures at the outside [facility]. 

Discussed plan of care with the patient and son at bedside.  She continues to be in the

ICU.  Spent more than 25 minutes of critical care time on this case. 

 

 

 

 

______________________________

MD NEGRA Saldaña/OSCAR

D:  08/01/2019 17:51:54

T:  08/01/2019 20:40:44

Job #:  [id number]

## 2019-08-01 NOTE — DIAGNOSTIC IMAGING REPORT
EXAMINATION:  CHEST SINGLE (PORTABLE)    



INDICATION: Pneumonia  



COMPARISON:  Chest CT 7/28/2019

     

FINDINGS:  AP view   



TUBES and LINES:  None.



LUNGS:  Persistent hazy opacity in the right middle lobe.   Hazy opacity in the

right upper lobe.



PLEURA:  Small right pleural effusion.



HEART AND MEDIASTINUM:  The cardiomediastinal silhouette is unremarkable.    



BONES AND SOFT TISSUES:  No acute osseous lesion.  Soft tissues are

unremarkable.



UPPER ABDOMEN: No free air under the diaphragm.    



IMPRESSION: 



Persistent findings right middle lobe pneumonia and small right pleural

effusion.

Hazy opacity in the right upper lobe may represent scarring or pneumonia.



Signed by: Drew Obregon DO on 8/1/2019 6:23 AM

## 2019-08-01 NOTE — NUR
0840- Dr. Gongora informed of episodes of V Tach last night. 

1000- ER called for final BC results, still not available per ER. [facility] lab also 
called, final results still pending. 

1215- Pt ambulated to restroom and went into SVT ('s). Dr. Gongora paged to notify of 
event. Patient placed back on cardizem drip. 

1415- [facility] lab called again, per [place] results still pending.

## 2019-08-02 VITALS — SYSTOLIC BLOOD PRESSURE: 126 MMHG | DIASTOLIC BLOOD PRESSURE: 68 MMHG

## 2019-08-02 VITALS — SYSTOLIC BLOOD PRESSURE: 108 MMHG | DIASTOLIC BLOOD PRESSURE: 64 MMHG

## 2019-08-02 VITALS — SYSTOLIC BLOOD PRESSURE: 130 MMHG | DIASTOLIC BLOOD PRESSURE: 65 MMHG

## 2019-08-02 VITALS — SYSTOLIC BLOOD PRESSURE: 128 MMHG | DIASTOLIC BLOOD PRESSURE: 59 MMHG

## 2019-08-02 VITALS — DIASTOLIC BLOOD PRESSURE: 64 MMHG | SYSTOLIC BLOOD PRESSURE: 140 MMHG

## 2019-08-02 VITALS — DIASTOLIC BLOOD PRESSURE: 66 MMHG | SYSTOLIC BLOOD PRESSURE: 128 MMHG

## 2019-08-02 VITALS — SYSTOLIC BLOOD PRESSURE: 140 MMHG | DIASTOLIC BLOOD PRESSURE: 64 MMHG

## 2019-08-02 VITALS — SYSTOLIC BLOOD PRESSURE: 133 MMHG | DIASTOLIC BLOOD PRESSURE: 72 MMHG

## 2019-08-02 VITALS — DIASTOLIC BLOOD PRESSURE: 69 MMHG | SYSTOLIC BLOOD PRESSURE: 135 MMHG

## 2019-08-02 VITALS — SYSTOLIC BLOOD PRESSURE: 125 MMHG | DIASTOLIC BLOOD PRESSURE: 58 MMHG

## 2019-08-02 VITALS — SYSTOLIC BLOOD PRESSURE: 115 MMHG | DIASTOLIC BLOOD PRESSURE: 49 MMHG

## 2019-08-02 VITALS — SYSTOLIC BLOOD PRESSURE: 123 MMHG | DIASTOLIC BLOOD PRESSURE: 50 MMHG

## 2019-08-02 VITALS — SYSTOLIC BLOOD PRESSURE: 134 MMHG | DIASTOLIC BLOOD PRESSURE: 61 MMHG

## 2019-08-02 VITALS — DIASTOLIC BLOOD PRESSURE: 61 MMHG | SYSTOLIC BLOOD PRESSURE: 125 MMHG

## 2019-08-02 VITALS — DIASTOLIC BLOOD PRESSURE: 69 MMHG | SYSTOLIC BLOOD PRESSURE: 131 MMHG

## 2019-08-02 VITALS — DIASTOLIC BLOOD PRESSURE: 54 MMHG | SYSTOLIC BLOOD PRESSURE: 117 MMHG

## 2019-08-02 LAB
ANION GAP SERPL CALC-SCNC: 16.5 MMOL/L (ref 8–16)
BASOPHILS # BLD AUTO: 0.1 10*3/UL (ref 0–0.1)
BASOPHILS NFR BLD AUTO: 0.8 % (ref 0–1)
BUN SERPL-MCNC: 8 MG/DL (ref 7–26)
BUN/CREAT SERPL: 13 (ref 6–25)
CALCIUM SERPL-MCNC: 9.7 MG/DL (ref 8.4–10.2)
CHLORIDE SERPL-SCNC: 103 MMOL/L (ref 98–107)
CO2 SERPL-SCNC: 25 MMOL/L (ref 22–29)
DEPRECATED NEUTROPHILS # BLD AUTO: 3.8 10*3/UL (ref 2.1–6.9)
EGFRCR SERPLBLD CKD-EPI 2021: > 60 ML/MIN (ref 60–?)
EOSINOPHIL # BLD AUTO: 0.2 10*3/UL (ref 0–0.4)
EOSINOPHIL NFR BLD AUTO: 4.1 % (ref 0–6)
ERYTHROCYTE [DISTWIDTH] IN CORD BLOOD: 13.1 % (ref 11.7–14.4)
GLUCOSE SERPLBLD-MCNC: 89 MG/DL (ref 74–118)
HCT VFR BLD AUTO: 42.1 % (ref 34.2–44.1)
HGB BLD-MCNC: 13.2 G/DL (ref 12–16)
LYMPHOCYTES # BLD: 1.2 10*3/UL (ref 1–3.2)
LYMPHOCYTES NFR BLD AUTO: 20.7 % (ref 18–39.1)
MCH RBC QN AUTO: 27.3 PG (ref 28–32)
MCHC RBC AUTO-ENTMCNC: 31.4 G/DL (ref 31–35)
MCV RBC AUTO: 87.2 FL (ref 81–99)
MONOCYTES # BLD AUTO: 0.6 10*3/UL (ref 0.2–0.8)
MONOCYTES NFR BLD AUTO: 9.3 % (ref 4.4–11.3)
NEUTS SEG NFR BLD AUTO: 64.3 % (ref 38.7–80)
PLATELET # BLD AUTO: 271 X10E3/UL (ref 140–360)
POTASSIUM SERPL-SCNC: 3.5 MMOL/L (ref 3.5–5.1)
RBC # BLD AUTO: 4.83 X10E6/UL (ref 3.6–5.1)
SODIUM SERPL-SCNC: 141 MMOL/L (ref 136–145)

## 2019-08-02 RX ADMIN — AMLODIPINE BESYLATE SCH MG: 5 TABLET ORAL at 09:35

## 2019-08-02 RX ADMIN — METOPROLOL TARTRATE SCH MG: 25 TABLET, FILM COATED ORAL at 17:49

## 2019-08-02 RX ADMIN — ATORVASTATIN CALCIUM SCH MG: 20 TABLET, FILM COATED ORAL at 20:44

## 2019-08-02 RX ADMIN — METOPROLOL TARTRATE SCH MG: 25 TABLET, FILM COATED ORAL at 09:35

## 2019-08-02 RX ADMIN — VANCOMYCIN HYDROCHLORIDE SCH MLS/HR: 1 INJECTION, SOLUTION INTRAVENOUS at 04:28

## 2019-08-02 RX ADMIN — CEFEPIME HYDROCHLORIDE SCH MLS/HR: 1 INJECTION, POWDER, FOR SOLUTION INTRAMUSCULAR; INTRAVENOUS at 06:09

## 2019-08-02 RX ADMIN — ENOXAPARIN SODIUM SCH MG: 40 INJECTION SUBCUTANEOUS at 17:49

## 2019-08-02 NOTE — NUR
Received patient from the ICU at this time. Tele box #[id number] in place. Reading NSR at rate of 
86. Patient is sitting on the side of bed. O2 via NC in place at 2.5 liters. Patient has no 
complaints of pain at this time. Call bell within reach. Patient's  and son at the 
bedside.

## 2019-08-02 NOTE — NUR
spoke to [facility], they state cultures are still pending, but they will call me back 
when they have results

## 2019-08-02 NOTE — PROGRESS NOTE
DATE:  08/02/2019

 

Medicine Progress Note 

 

SUBJECTIVE:  The patient is doing well today with no complaints.  She is off the

diltiazem drip.  She is also discussed the case with ID and he is agreed to no more

antibiotics even for discharge.  She is still hypoxic with high oxygen home O2. 

 

PHYSICAL EXAMINATION:

VITAL SIGNS:  Temperature is 97.2, pulse 80, respiratory rate is 18, blood pressure

126/68, and pulse ox 92% on 2 L nasal cannula. 

GENERAL:  Not in acute distress.  Alert and oriented x3.  Cooperative on examination. 

HEENT:  Head; normocephalic, atraumatic.  Eyes; pupils are equal, round, and reactive to

light bilaterally.  Extraocular movements intact bilaterally.  Throat; no evidence of

erythema or exudates in the posterior pharynx.  Has poor dentition. 

NECK:  Supple.  Good range of motion. 

PULMONARY:  Clear to auscultation bilaterally.  No wheezing, rhonchi, or crackles

appreciated. 

CARDIOVASCULAR:  Positive S1 and S2.  No murmurs, rubs, or gallops appreciated. 

ABDOMEN:  Soft, nondistended, and nontender to palpation.  Bowel sounds present. 

MUSCULOSKELETAL:  Strength is 5/5 throughout.  No evidence of any muscle deficits on

examination.  No weakness appreciated. 

NEUROLOGIC:  Cranial nerves II through XII grossly intact.  No evidence of any

neurological deficits on exam. 

SKIN:  Intact.  Warm to touch.  Good cap refill. 

PSYCHIATRIC:  Normal affect and mood. 

EXTREMITIES:  No edema.  Good range of motion throughout.

LABORATORY FINDINGS:  Show white count 5.8, hemoglobin 13, hematocrit is 42, and

platelets of 271.  Chemistry; sodium 141, potassium 3.5, chloride 103, bicarb 25, anion

gap of 16, BUN is 8, creatinine is 0.6, glucose is 89, calcium is 9.7, and magnesium is

1.7. 

 

MICROBIOLOGY:  Repeat blood cultures were negative.  Sputum cultures found to be

negative. 

 

IMAGING STUDIES:  Chest x-ray this morning shows no significant infiltrate.  Upright

right middle lobe consolidation consistent with no pneumonia. 

 

IMPRESSION:  

1. Acute hypoxemic respiratory distress secondary to multifocal pneumonia.

2. Continues to be on nasal cannula and desaturations once the oxygen is removed.

3. Community-acquired pneumonia, on antibiotics.

4. Hypertension.

5. Hyperlipidemia.

6. Left bundle branch block.

7. Supraventricular tachycardia, improved.

8. Coag-negative Staphylococcus bacteremia found to be contaminant.

 

PLAN:  At this time, she still needs home oxygen.  At this time, we are arranging for

home O2 as well.  I discussed antibiotic situation with Infectious Disease and

recommends continue IV antibiotics for now while here, but discharge no antibiotics

needed.  The blood cultures at the outside facility were found to be a contaminant.  In

terms of her heart rate is much improved and 

controlled and Cardiology is following.  She is currently on oral beta blockers.  We

will get a.m. labs.  Pulmonary, Cardiology, and ID are following closely otherwise. 

 

 

 

 

______________________________

MD NEGRA Saldaña/MODL

D:  08/02/2019 17:42:24

T:  08/02/2019 19:54:59

Job #:  [id number]

## 2019-08-02 NOTE — NUR
Nutrition Follow-up Note



RD Recommendation(s) for Physician: 

-Continue diet as ordered 



Plan of Care: RD following, monitoring for tolerance and adequacy 



Nutrition reason for involvement:

Follow up 



RD Assessment

8/2: Visited pt in the room. Pt reported improvement in her appetite. RN recorded 50-75% 
meal intake. No GI complains reported. LBM  8/2. Pt denied any chewing or swallowing 
difficulty. Current diet is appropriate and adequate. 



7/29: 84 YOF admitted for PNA, seen today per MST screen. Pt reports decreased appetite x 1 
month and that she was forcing herself to eat, but could not quantify if she was eating less 
than her typical intake. Per chart pt eating 50% of meals. Pt can not recall UBW, appears 
well nourished. Pt reports tolerating Regular diet, receptive to Ensure Compact. Pt denies 
any GI distress or difficulties chewing or swallowing. Pt discussed during am rounds. Will 
monitor and continue to follow. 



Principal Problems/Diagnoses: Pneumonia 



PMH: HTN, HLD



GI: LBM 8/2 



Skin: intact 



Labs: reviewed; BMP WNL 



Meds: lipitor, lovenox



Ht: 64 in          

Wt: 150 lb; 142.19lb           

BMI: 25.7          

IBW: 120 lb      



Malnutrition Evaluation (7/29/19)

The patient does not meet criteria for a specified degree of malnutrition at this time. Will 
re-evaluate at follow-up as appropriate. 



Nutrition Prescription (Diet Order): Regular 



Estimated Nutritional Needs:

8588-1755 calories/day (22-30 kcal/kg CBW)

 g protein/day (1-1.5 g pro/kg CBW)



Diet Adequacy:

meeting calorie needs, meeting protein needs



Diet Education Needs Assessment:

Diet education not indicated; patient on regular diet. 



Nutrition Care Level: Low 



Nutrition Diagnosis: No nutrition diagnosis at this time.  



Goal: Patient will meet % of estimated needs by follow up 



Progress: Goal met 



Interventions:

General healthful diet



Monitoring/Evaluation:

Total energy intake, Total protein intake, Diet, Weight change 





Signed: Emani Castillo MS, RD, LD

## 2019-08-02 NOTE — NUR
Received patient from day nurse, patient is alert and oriented, safety and fall precautions 
maintained as per hospital protocol: bed in lowest postion and locked, needed items beside 
bed, call bell close to patient and patient instructed to use it to call nurses for any 
assistance needed, patient verbalized understanding. patient is currently stable will 
continue to monitor.  at bed side

## 2019-08-02 NOTE — NUR
attempt to wean O2. patient is on 3l nasal cannula. after removing o2, patient's o2 sat 
dropped to 85% within 60 seconds of being off. patient remained sitting on bed during test.

## 2019-08-02 NOTE — NUR
[facility] with dr. cooper.

-------------------------------------------------------------------------------

Addendum: 08/02/19 at 1024 by Samantha Godoy RN

-------------------------------------------------------------------------------

copy placed in chart

## 2019-08-02 NOTE — NUR
Pulmonary Medicine  



DATE OF ENCOUNTER:  08/02/2019

  



SUBJECTIVE:  

Arrhythmia controlled.  weaned off of diltiazem.  3 L/min oxygen

89-92% saturation.  eats some

 

REVIEW OF SYSTEMS:

no chest tightness, no bleeding

 

 

OBJECTIVE: 

VITAL SIGNS:   vital signs noted per the chart record. 

GENERAL:  No acute distress, alert and calm. 

HEENT:  Normocephalic, atraumatic. 

NECK:  Supple.  Throat midline. 

LUNGS:  Bilateral air entry moderate, rare rhonchi. 

CARDIOVASCULAR:  S1, S2.  No murmurs, rubs, or gallops. 

ABDOMEN:  Soft, nontender. 

EXTREMITIES:  No clubbing, no cyanosis, no edema. 

INTEGUMENT:  No rash.  No purpura.

 

LABORATORY DATA:  

3.5 k, 0.6 cr.  6 wbc.  42 hct.  271 plt

 

IMPRESSION AND PLAN:  

1. Abnormal chest radiography, right upper lobe semi-solid nodule with 
cavitation,

with additional larger ground-glass penumbra. 

2. Abnormal chest radiography, dense right middle lobe consolidation, possible

community-acquired pneumonia,  rule out other including pneumonia types 
including postobstructive, Cavitary staphylococcal pneumonia. 

3. Abnormal chest radiography, hx multiple mainly abdominal granulomas

4. Hypoxemia, improved now 92% on room air oxygen when walking.

5. Former smoker 30+ years, quit about 25 years ago.

6. Hypertension.

7. History of urinary retention.

8. Hyperlipidemia.

9. History of environmental exposures, growing up with fishing commonly.

10. 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 
 working the pipelines. 



Antibiotics for sepsis per ID  

--Await knowledge if this will be a full response versus partial response, 
given the complexity of

the radiographic findings and her history. 

Patient defers early bronchoscopy, and prefers watchful waiting with close 
followup to ensure radiography of the chest results.

--If chest x-ray does not clear up, she will probably need a PET-CT and 
bronchoscopy

Follow-up outside blood cultures for identification and sensitivity.  

SVT treatment per cardiology, can leave the ICU today

Continue mobilizing 

 

Thank you very much, Dr. Cueto and Dr. Eric Daniels for allowing me a chance to

participate in the care of Ms. Blevins.  Please call for any questions.

## 2019-08-02 NOTE — NUR
ORDERS FOR HOME O2

SATS ON ROOM AIR 85%

CHOICE LETTER SIGNED FOR [facility] [phone]

COPY OF CHOICE LETTER TO PT

ORDERS FAXED [phone]

CONFIRMATION REC'D

SPOKE WITH BRIGETTE SAMUEL AT [facility] AND HE WILL HAVE TANKS DELIVERED THIS EVENING OR IN AM FOR 
POSSIBLE DC THIS WEEKEND

## 2019-08-02 NOTE — DIAGNOSTIC IMAGING REPORT
EXAMINATION:  CHEST SINGLE (PORTABLE)    



INDICATION: Hypoxia



COMPARISON: Chest radiograph of 8/1/2019

     

FINDINGS:



LINES/TUBES:EKG leads overlie the chest.



LUNGS:The lungs are well-inflated. Unchanged right middle lobe airspace opacity

silhouetting the right heart border. 



PLEURA:Small right pleural effusion. No pneumothorax.



MEDIASTINUM:The cardiomediastinal silhouette appears unchanged in size and

shape. Atherosclerotic calcifications of the thoracic aorta.



BONES/SOFT TISSUES:No acute osseous injury.



ABDOMEN:No free air under the diaphragm.





IMPRESSION: 

No significant interval change. Unchanged right middle lobe consolidation

consistent with known pneumonia.



Signed by: Nima Perry MD on 8/2/2019 3:43 PM

## 2019-08-03 VITALS — SYSTOLIC BLOOD PRESSURE: 122 MMHG | DIASTOLIC BLOOD PRESSURE: 61 MMHG

## 2019-08-03 VITALS — DIASTOLIC BLOOD PRESSURE: 61 MMHG | SYSTOLIC BLOOD PRESSURE: 122 MMHG

## 2019-08-03 VITALS — SYSTOLIC BLOOD PRESSURE: 130 MMHG | DIASTOLIC BLOOD PRESSURE: 61 MMHG

## 2019-08-03 VITALS — SYSTOLIC BLOOD PRESSURE: 143 MMHG | DIASTOLIC BLOOD PRESSURE: 66 MMHG

## 2019-08-03 VITALS — SYSTOLIC BLOOD PRESSURE: 130 MMHG | DIASTOLIC BLOOD PRESSURE: 60 MMHG

## 2019-08-03 VITALS — SYSTOLIC BLOOD PRESSURE: 131 MMHG | DIASTOLIC BLOOD PRESSURE: 60 MMHG

## 2019-08-03 VITALS — DIASTOLIC BLOOD PRESSURE: 62 MMHG | SYSTOLIC BLOOD PRESSURE: 134 MMHG

## 2019-08-03 VITALS — SYSTOLIC BLOOD PRESSURE: 139 MMHG | DIASTOLIC BLOOD PRESSURE: 59 MMHG

## 2019-08-03 LAB
ANION GAP SERPL CALC-SCNC: 15.6 MMOL/L (ref 8–16)
BASOPHILS # BLD AUTO: 0.1 10*3/UL (ref 0–0.1)
BASOPHILS NFR BLD AUTO: 1 % (ref 0–1)
BUN SERPL-MCNC: 10 MG/DL (ref 7–26)
BUN/CREAT SERPL: 16 (ref 6–25)
CALCIUM SERPL-MCNC: 9.5 MG/DL (ref 8.4–10.2)
CHLORIDE SERPL-SCNC: 103 MMOL/L (ref 98–107)
CO2 SERPL-SCNC: 27 MMOL/L (ref 22–29)
DEPRECATED NEUTROPHILS # BLD AUTO: 3.6 10*3/UL (ref 2.1–6.9)
EGFRCR SERPLBLD CKD-EPI 2021: > 60 ML/MIN (ref 60–?)
EOSINOPHIL # BLD AUTO: 0.3 10*3/UL (ref 0–0.4)
EOSINOPHIL NFR BLD AUTO: 4.3 % (ref 0–6)
ERYTHROCYTE [DISTWIDTH] IN CORD BLOOD: 13.2 % (ref 11.7–14.4)
GLUCOSE SERPLBLD-MCNC: 75 MG/DL (ref 74–118)
HCT VFR BLD AUTO: 39 % (ref 34.2–44.1)
HGB BLD-MCNC: 12.1 G/DL (ref 12–16)
LYMPHOCYTES # BLD: 1.3 10*3/UL (ref 1–3.2)
LYMPHOCYTES NFR BLD AUTO: 22.5 % (ref 18–39.1)
MCH RBC QN AUTO: 27.4 PG (ref 28–32)
MCHC RBC AUTO-ENTMCNC: 31 G/DL (ref 31–35)
MCV RBC AUTO: 88.2 FL (ref 81–99)
MONOCYTES # BLD AUTO: 0.6 10*3/UL (ref 0.2–0.8)
MONOCYTES NFR BLD AUTO: 9.5 % (ref 4.4–11.3)
NEUTS SEG NFR BLD AUTO: 62 % (ref 38.7–80)
PLATELET # BLD AUTO: 252 X10E3/UL (ref 140–360)
POTASSIUM SERPL-SCNC: 3.6 MMOL/L (ref 3.5–5.1)
RBC # BLD AUTO: 4.42 X10E6/UL (ref 3.6–5.1)
SODIUM SERPL-SCNC: 142 MMOL/L (ref 136–145)

## 2019-08-03 RX ADMIN — AMLODIPINE BESYLATE SCH MG: 5 TABLET ORAL at 09:24

## 2019-08-03 RX ADMIN — ENOXAPARIN SODIUM SCH MG: 40 INJECTION SUBCUTANEOUS at 17:21

## 2019-08-03 RX ADMIN — METOPROLOL TARTRATE SCH MG: 25 TABLET, FILM COATED ORAL at 17:18

## 2019-08-03 RX ADMIN — ATORVASTATIN CALCIUM SCH MG: 20 TABLET, FILM COATED ORAL at 21:04

## 2019-08-03 RX ADMIN — METOPROLOL TARTRATE SCH MG: 25 TABLET, FILM COATED ORAL at 09:24

## 2019-08-03 NOTE — NUR
Pulmonary Medicine  



DATE OF ENCOUNTER:  08/03/2019

  



SUBJECTIVE:  

Arrhythmia controlled again per nursing.  

3 L/min oxygen

patient qualified for home oxygen

CXR with ~stable pneumonitis again 

 

REVIEW OF SYSTEMS:

no chest tightness, no bleeding

 

 

OBJECTIVE: 

VITAL SIGNS:   vital signs noted per the chart record. 

GENERAL:  No acute distress, alert and calm. 

HEENT:  Normocephalic, atraumatic. 

NECK:  Supple.  Throat midline. 

LUNGS:  Bilateral air entry moderate, rare rhonchi. 

CARDIOVASCULAR:  S1, S2.  No murmurs, rubs, or gallops. 

ABDOMEN:  Soft, nontender. 

EXTREMITIES:  No clubbing, no cyanosis, no edema. 

INTEGUMENT:  No rash.  No purpura.

 

LABORATORY DATA:  

3.6 k, hco3 27, cr 0.61.  wbc 6 , 39 hct, 252 plt.  

 

IMPRESSION AND PLAN:  

1. Abnormal chest radiography, right upper lobe semi-solid nodule with 
cavitation,

with additional larger ground-glass penumbra. 

2. Abnormal chest radiography, dense right middle lobe consolidation, possible

community-acquired pneumonia,  rule out other including pneumonia types 
including postobstructive, Cavitary staphylococcal pneumonia. 

3. Abnormal chest radiography, hx multiple mainly abdominal granulomas

4. Hypoxemia, improved now 92% on room air oxygen when walking.

5. Former smoker 30+ years, quit about 25 years ago.

6. Hypertension.

7. History of urinary retention.

8. Hyperlipidemia.

9. History of environmental exposures, growing up with fishing commonly.

10. 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 
 working the pipelines. 



Await knowledge if this will be a full response versus partial response, given 
the complexity of

the radiographic findings and her history. 

--Continue clinical and radiographic follow up

Patient defers early bronchoscopy, and prefers watchful waiting with close 
followup to ensure radiography of the chest results.

--If chest x-ray does not clear up, she will probably need a PET-CT and 
bronchoscopy

Outside blood cultures  per ID

SVT treatment per cardiology

Continue mobilizing 

Home oxygen evaluation

 

Thank you very much, Dr. Cueto and Dr. Eric Daniels for allowing me a chance to

participate in the care of Ms. Blevins.  Please call for any questions.

## 2019-08-03 NOTE — NUR
Patient received oxygen tanks at this time. Phoned Dr. Cueto to let him know that I spoke 
with Dr. Artem Gongora. Dr. Gongora said patient could be discharged from his standpoint and she 
would not need any prescriptions. Dr. Cueto stated he would see the patient tomorrow. 

-------------------------------------------------------------------------------

Addendum: 08/03/19 at 2009 by Mario Davies RN

-------------------------------------------------------------------------------

Phoned patient's daughter-in-law, Ange, to let her know the plan for discharge tomorrow.

## 2019-08-03 NOTE — PROGRESS NOTE
DATE:  08/03/2019

 

Medicine Progress Note 

 

SUBJECTIVE:  The patient is doing well today.  She does have occasional elevated heart

rate.  Still pending oxygen for home. 

 

LABORATORY FINDINGS:  Show white count 5.8, hemoglobin 12, hematocrit 39, and platelets

252.  Chemistries reviewed and stable. 

 

MICROBIOLOGY:  Blood culture is negative.  Sputum culture is negative. 

 

Chest x-ray, persistent findings of pneumonia in the right middle lobe, possibly with

small right pleural effusion. 

 

PHYSICAL EXAMINATION:

VITAL SIGNS:  Temperature is 97.2, pulse 85, respiratory rate 18, blood pressure is

130/61, pulse ox is 92% on 3 L nasal cannula. 

GENERAL:  In no acute distress, oriented x3.  Cooperative on examination. 

HEENT:  Head; normocephalic, atraumatic.  Eyes; pupils are equal, round, and reactive to

light bilaterally.  Extraocular movements intact bilaterally. Throat; no evidence of

erythema or exudates in the posterior pharynx.  Has poor dentition. 

NECK:  Supple.  Good range of motion. 

PULMONARY:  Clear to auscultation bilaterally.  No wheezing, rhonchi, or crackles

appreciated. 

CARDIOVASCULAR:  Positive S1 and S2.  No murmurs, rubs, or gallops appreciated. 

ABDOMEN:  Soft, nondistended, and nontender to palpation.  Bowel sounds present. 

MUSCULOSKELETAL:  Strength is 5/5 throughout.  No evidence of any muscle deficits on

examination.  No weakness appreciated. 

NEUROLOGIC:  Cranial nerves II through XII grossly intact.  No evidence of any

neurological deficits on exam. 

SKIN:  Intact.  Warm to touch.  Good cap refill. 

PSYCHIATRIC:  Normal affect and mood. 

EXTREMITIES:  No edema.  Good range of motion throughout.

IMPRESSION:  

1. Acute hypoxemic respiratory distress, secondary to multifocal pneumonia.

2. Community-acquired pneumonia.

3. Hypertension.

4. Hyperlipidemia.

5. Left bundle branch block.

6. Supraventricular tachycardia, improved with occasional elevated heart rate.

7. Coag-negative Staphylococcus bacteremia, found to be a contaminant.

 

PLAN:  At this time, we are still pending home oxygen needed.  The patient does not need

antibiotics for discharge.  All antibiotics have been discontinued.  In relation to her

heart rate, I did increase her metoprolol to 50 mg p.o. b.i.d.  We are going to monitor

this very closely. 

Pulmonary, Cardiology, ID all cleared the patient for discharge.  I am just waiting for

home O2 to be arranged for discharge to home. 

 

 

 

 

______________________________

MD NEGRA Saldaña/OSCAR

D:  08/03/2019 17:24:44

T:  08/03/2019 19:38:24

Job #:  [id number]

## 2019-08-03 NOTE — NUR
Patient received sitting up in bed. AAO x 3.  at bedside. Patient had no complaints 
of pain. No signs of respiratory distress. Fall precautions implemented. Patient instructed 
to call for assistance when needed. Call light within reach.

## 2019-08-03 NOTE — DIAGNOSTIC IMAGING REPORT
EXAMINATION:  CHEST SINGLE (PORTABLE)    



INDICATION: Pneumonia  



COMPARISON:  Chest radiograph 8/2/2019

     

FINDINGS:  AP view   



TUBES and LINES:  None.



LUNGS:  Persistent airspace opacity along the right heart border silhouettes

the right heart border, and opacity in the right lateral lung base silhouettes

the right costophrenic angle.   Left lung well aerated.



PLEURA:  Costophrenic angle is silhouetted.



HEART AND MEDIASTINUM:  The cardiomediastinal silhouette is unremarkable.

Aortic arch calcifications



BONES AND SOFT TISSUES:  No acute osseous lesion.  Soft tissues are

unremarkable.



UPPER ABDOMEN: No free air under the diaphragm.    



IMPRESSION: 



Persistent findings of pneumonia in the right middle lobe, possibly with small

right pleural effusion.





Signed by: Drew Obregon DO on 8/3/2019 6:17 AM

## 2019-08-04 VITALS — SYSTOLIC BLOOD PRESSURE: 117 MMHG | DIASTOLIC BLOOD PRESSURE: 58 MMHG

## 2019-08-04 VITALS — DIASTOLIC BLOOD PRESSURE: 63 MMHG | SYSTOLIC BLOOD PRESSURE: 133 MMHG

## 2019-08-04 VITALS — DIASTOLIC BLOOD PRESSURE: 56 MMHG | SYSTOLIC BLOOD PRESSURE: 119 MMHG

## 2019-08-04 VITALS — DIASTOLIC BLOOD PRESSURE: 60 MMHG | SYSTOLIC BLOOD PRESSURE: 129 MMHG

## 2019-08-04 RX ADMIN — AMLODIPINE BESYLATE SCH MG: 5 TABLET ORAL at 08:55

## 2019-08-04 RX ADMIN — METOPROLOL TARTRATE SCH MG: 25 TABLET, FILM COATED ORAL at 08:55

## 2019-08-04 NOTE — NUR
Patient discharged off the unit via wheelchair with 2 home 

oxygen tanks. patient transported by grandchildren. All 

education completed. Reminded patient to call oxygen 

provider once home to receive other needed equipment. All 

present understood. patient states no pain. Patient is in 

no distress.

## 2019-08-04 NOTE — NUR
Pulmonary Medicine  



DATE OF ENCOUNTER:  08/04/2019

  



SUBJECTIVE:  

SVT recurred reportedly

ao x 3

no resp distress

home oxygen was delivered 

 

REVIEW OF SYSTEMS:

no double vision, no bleeding

 

 

OBJECTIVE: 

VITAL SIGNS:   vital signs noted per the chart record. 

GENERAL:  No acute distress, alert and calm. 

HEENT:  Normocephalic, atraumatic. 

NECK:  Supple.  Throat midline. 

LUNGS:  Bilateral air entry moderate, rare rhonchi. 

CARDIOVASCULAR:  S1, S2.  No murmurs, rubs, or gallops. 

ABDOMEN:  Soft, nontender. 

EXTREMITIES:  No clubbing, no cyanosis, no edema. 

INTEGUMENT:  No rash.  No purpura.

 

LABORATORY DATA:  

no new updates

 

IMPRESSION AND PLAN:  

1. Abnormal chest radiography, right upper lobe semi-solid nodule with 
cavitation,

with additional larger ground-glass penumbra. 

2. Abnormal chest radiography, dense right middle lobe consolidation, possible

community-acquired pneumonia,  rule out other including pneumonia types 
including postobstructive, Cavitary staphylococcal pneumonia. 

3. Abnormal chest radiography, hx multiple mainly abdominal granulomas

4. Hypoxemia, improved now 92% on room air oxygen when walking.

5. Former smoker 30+ years, quit about 25 years ago.

6. Hypertension.

7. History of urinary retention.

8. Hyperlipidemia.

9. History of environmental exposures, growing up with fishing commonly.

10. 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 
 working the pipelines. 

11.  GPC in outside blood cultures



Await knowledge if this will be a full response versus partial response, given 
the complexity of

the radiographic findings and her history. 

--outpatient follow up

--Continue clinical and radiographic follow up

--likely for repeat CT chest in 2-3 months if patient continues to progress

Patient defers early bronchoscopy, and prefers watchful waiting with close 
followup to ensure radiography of the chest results.

--she might need a PET-CT and bronchoscopy if infiltrates do not clear

Outside blood cultures  per ID

SVT treatment per cardiology

Continue mobilizing 

Home oxygen delivered

from a pulmonary point of view, ok for discharge



Thank you very much, Dr. Cueto and Dr. Eric Daniels for allowing me a chance to

participate in the care of Ms. Blevins.  Please call for any questions.

## 2019-08-05 NOTE — DISCHARGE SUMMARY
FINAL DISCHARGE DIAGNOSES:  

1. Acute hypoxemic respiratory distress secondary to multifocal pneumonia.

2. Probable underlying chronic obstructive pulmonary disease with history of smoking.

3. Hypertension.

4. Hyperlipidemia.

5. Left bundle branch block, no further workup needed.

6. Supraventricular tachycardiac with beta blockers.

7. Coag-negative Staphylococcus bacteremia found to be contaminant.

 

CONSULTANTS:  Cardiology, pulmonary Infectious Disease.

 

VITAL SIGNS:  Temperature 97.1, pulse 69, respiratory rate 17, blood pressure 119/56,

pulse ox 94% on 2 L nasal cannula. 

 

LABORATORY DATA:  Show white count 5.8, hemoglobin 12, hematocrit 39, and platelets of

252.  Chemistry; sodium 142, potassium 3.6, chloride 103, bicarb 27, anion gap is 15,

BUN __________, creatinine 0.61, glucose 75, calcium is 9.5. 

 

MICROBIOLOGY:  Blood cultures were negative.  Sputum cultures were negative.

 

IMAGING STUDIES:  Chest CT shows no evidence of pulmonary embolism.  Findings consistent

with right middle lobe pneumonia.  A 1.6 cm nodular density with linear opacities

adjacent to substernal space and focal bronchiectasis likely represents a chronic

postinfectious scarring.  Small right pleural effusion involving inferior moderate

central bronchial wall thickening in the upper and lower lobes __________ hypodense

lesion in the left adrenal gland consistent with benign lipid rich adenoma.

Nonobstructing calculi in the right superior pole with the largest measuring 2 mm.

Calcified mediastinal, right hilar nodes in splenic and hepatic granulomas consistent

with prior granulomatous disease.  Chest x-ray prior to discharge consistent with

findings __________. 

 

HOSPITAL COURSE:  This is an 84-year-old  female, who came into the ED with

underlying respiratory distress and was treated for acute hypoxemic respiratory distress

for underlying multifocal pneumonia.  While here Pulmonary was involved and consulted

and treated appropriately.  Blood cultures were found to be negative.  She was on

broad-spectrum IV antibiotics.  Initially, the patient came from an outside ER and was

discharged to home after calling to find out if the blood cultures were positive.  At

that time, the patient had her blood cultures done several days prior to being

discharged initially and was told that the cultures were negative.  Floor nurse also

spoke with the [facility] as well as I did.  At that time, the cultures were

negative.  Soon later, [facility] called and stated that both cultures were

positive in which I spoke with the son, Hermes Blevins to bring his mother back for

further management and evaluation.  While here, the patient did well.  She was on IV

antibiotics and Infectious Disease was consulted.  After while the blood cultures at the

[facility] ER were found to be coag-negative staph, found to be a contaminant.  No

further antibiotics were needed and the patient was discharged on no antibiotics per

Infectious Disease recommendations.  She did also have an episode of SVT requiring

Cardiology consultation.  The patient was sent to the ICU, was on Cardizem drip and then

converted back to oral metoprolol.  Her metoprolol dosing medications were adjusted

accordingly with much improved heart rate prior to being discharged home.  In relation

to her pulmonary status, she did qualify for home O2 which she was discharged home with

home oxygen.  On discharge number, every one cleared the patient from Pulmonary and

Cardiology including Infectious Disease.  The patient was discharged home with no other

issues back to normal baseline.  On the day of discharge, vital signs were stable, labs

reviewed and stable.  The patient is seen and evaluated, examined thoroughly on the day

of discharge.  No other complaints.  The patient verbalized understanding and agreed to

plan of care.  A followup appointment as an outpatient with the primary care physician

in 1 week and ID and Cardiology in 2 weeks' time.  She also will follow up with

Pulmonary in 2 weeks' time. 

 

MEDICATIONS:  See med reconciliation form.

 

DISPOSITION:  Home.

 

CONDITION:  Stable.

 

DIET:  Heart healthy. 

 

In the event of any worsening symptoms, the patient was advised to come back to the ED

for further evaluation. 

 

Discharge summary took greater than 35 minutes.

 

 

 

 

______________________________

MD NEGRA Saldaña/OSCAR

D:  08/04/2019 17:54:09

T:  08/05/2019 06:50:05

Job #:  [id number]

## 2019-10-29 ENCOUNTER — HOSPITAL ENCOUNTER (OUTPATIENT)
Dept: HOSPITAL 88 - RESP | Age: 84
End: 2019-10-29
Attending: INTERNAL MEDICINE
Payer: COMMERCIAL

## 2019-10-29 DIAGNOSIS — J44.9: ICD-10-CM

## 2019-10-29 DIAGNOSIS — J18.9: ICD-10-CM

## 2019-10-29 DIAGNOSIS — R09.02: Primary | ICD-10-CM

## 2019-10-29 DIAGNOSIS — Z87.891: ICD-10-CM

## 2019-10-29 DIAGNOSIS — R91.1: ICD-10-CM

## 2019-10-29 PROCEDURE — 94729 DIFFUSING CAPACITY: CPT

## 2019-10-29 PROCEDURE — 94060 EVALUATION OF WHEEZING: CPT

## 2019-10-29 PROCEDURE — 94727 GAS DIL/WSHOT DETER LNG VOL: CPT

## 2019-10-29 PROCEDURE — 94640 AIRWAY INHALATION TREATMENT: CPT

## 2019-11-06 ENCOUNTER — HOSPITAL ENCOUNTER (EMERGENCY)
Dept: HOSPITAL 88 - ER | Age: 84
Discharge: HOME | End: 2019-11-06
Payer: COMMERCIAL

## 2019-11-06 VITALS — WEIGHT: 142 LBS | BODY MASS INDEX: 24.24 KG/M2 | HEIGHT: 64 IN

## 2019-11-06 DIAGNOSIS — I10: ICD-10-CM

## 2019-11-06 DIAGNOSIS — Z88.2: ICD-10-CM

## 2019-11-06 DIAGNOSIS — N39.0: Primary | ICD-10-CM

## 2019-11-06 DIAGNOSIS — E78.5: ICD-10-CM

## 2019-11-06 LAB
ALBUMIN SERPL-MCNC: 3.7 G/DL (ref 3.5–5)
ALBUMIN/GLOB SERPL: 1.3 {RATIO} (ref 0.8–2)
ALP SERPL-CCNC: 71 IU/L (ref 40–150)
ALT SERPL-CCNC: 6 IU/L (ref 0–55)
ANION GAP SERPL CALC-SCNC: 14.4 MMOL/L (ref 8–16)
BACTERIA URNS QL MICRO: (no result) /HPF
BASOPHILS # BLD AUTO: 0.1 10*3/UL (ref 0–0.1)
BASOPHILS NFR BLD AUTO: 0.9 % (ref 0–1)
BILIRUB UR QL: (no result)
BUN SERPL-MCNC: 12 MG/DL (ref 7–26)
BUN/CREAT SERPL: 16 (ref 6–25)
CALCIUM SERPL-MCNC: 10 MG/DL (ref 8.4–10.2)
CHLORIDE SERPL-SCNC: 98 MMOL/L (ref 98–107)
CLARITY UR: (no result)
CO2 SERPL-SCNC: 29 MMOL/L (ref 22–29)
COLOR UR: (no result)
DEPRECATED NEUTROPHILS # BLD AUTO: 3.6 10*3/UL (ref 2.1–6.9)
DEPRECATED RBC URNS MANUAL-ACNC: (no result) /HPF (ref 0–5)
EGFRCR SERPLBLD CKD-EPI 2021: > 60 ML/MIN (ref 60–?)
EOSINOPHIL # BLD AUTO: 0.1 10*3/UL (ref 0–0.4)
EOSINOPHIL NFR BLD AUTO: 2.6 % (ref 0–6)
EPI CELLS URNS QL MICRO: (no result) /LPF
ERYTHROCYTE [DISTWIDTH] IN CORD BLOOD: 14.1 % (ref 11.7–14.4)
GLOBULIN PLAS-MCNC: 2.9 G/DL (ref 2.3–3.5)
GLUCOSE SERPLBLD-MCNC: 93 MG/DL (ref 74–118)
HCT VFR BLD AUTO: 41.3 % (ref 34.2–44.1)
HGB BLD-MCNC: 13.1 G/DL (ref 12–16)
KETONES UR QL STRIP.AUTO: NEGATIVE
LEUKOCYTE ESTERASE UR QL STRIP.AUTO: (no result)
LYMPHOCYTES # BLD: 1 10*3/UL (ref 1–3.2)
LYMPHOCYTES NFR BLD AUTO: 17.5 % (ref 18–39.1)
MCH RBC QN AUTO: 27.3 PG (ref 28–32)
MCHC RBC AUTO-ENTMCNC: 31.7 G/DL (ref 31–35)
MCV RBC AUTO: 86.2 FL (ref 81–99)
MONOCYTES # BLD AUTO: 0.7 10*3/UL (ref 0.2–0.8)
MONOCYTES NFR BLD AUTO: 12.9 % (ref 4.4–11.3)
NEUTS SEG NFR BLD AUTO: 65.9 % (ref 38.7–80)
NITRITE UR QL STRIP.AUTO: POSITIVE
PLATELET # BLD AUTO: 236 X10E3/UL (ref 140–360)
POTASSIUM SERPL-SCNC: 4.4 MMOL/L (ref 3.5–5.1)
PROT UR QL STRIP.AUTO: (no result)
RBC # BLD AUTO: 4.79 X10E6/UL (ref 3.6–5.1)
SODIUM SERPL-SCNC: 137 MMOL/L (ref 136–145)
SP GR UR STRIP: 1.02 (ref 1.01–1.02)
UROBILINOGEN UR STRIP-MCNC: 0.2 MG/DL (ref 0.2–1)
WBC #/AREA URNS HPF: (no result) /HPF (ref 0–5)

## 2019-11-06 PROCEDURE — 99284 EMERGENCY DEPT VISIT MOD MDM: CPT

## 2019-11-06 PROCEDURE — 87086 URINE CULTURE/COLONY COUNT: CPT

## 2019-11-06 PROCEDURE — 36415 COLL VENOUS BLD VENIPUNCTURE: CPT

## 2019-11-06 PROCEDURE — 81001 URINALYSIS AUTO W/SCOPE: CPT

## 2019-11-06 PROCEDURE — 85025 COMPLETE CBC W/AUTO DIFF WBC: CPT

## 2019-11-06 PROCEDURE — 87186 SC STD MICRODIL/AGAR DIL: CPT

## 2019-11-06 PROCEDURE — 80053 COMPREHEN METABOLIC PANEL: CPT
